# Patient Record
Sex: FEMALE | Race: WHITE | Employment: FULL TIME | URBAN - METROPOLITAN AREA
[De-identification: names, ages, dates, MRNs, and addresses within clinical notes are randomized per-mention and may not be internally consistent; named-entity substitution may affect disease eponyms.]

---

## 2017-01-27 ENCOUNTER — TELEPHONE (OUTPATIENT)
Dept: PULMONOLOGY | Age: 42
End: 2017-01-27

## 2017-02-02 ENCOUNTER — TELEPHONE (OUTPATIENT)
Dept: PULMONOLOGY | Age: 42
End: 2017-02-02

## 2017-02-13 ENCOUNTER — OFFICE VISIT (OUTPATIENT)
Dept: PULMONOLOGY | Age: 42
End: 2017-02-13

## 2017-02-13 VITALS
BODY MASS INDEX: 24.17 KG/M2 | RESPIRATION RATE: 16 BRPM | OXYGEN SATURATION: 96 % | DIASTOLIC BLOOD PRESSURE: 60 MMHG | WEIGHT: 154 LBS | SYSTOLIC BLOOD PRESSURE: 98 MMHG | HEIGHT: 67 IN | HEART RATE: 84 BPM

## 2017-02-13 DIAGNOSIS — R93.89 ABNORMAL CT OF THE CHEST: Primary | ICD-10-CM

## 2017-02-13 DIAGNOSIS — J01.91 ACUTE RECURRENT SINUSITIS, UNSPECIFIED LOCATION: ICD-10-CM

## 2017-02-13 DIAGNOSIS — J11.1 FLU: ICD-10-CM

## 2017-02-13 DIAGNOSIS — J45.51 SEVERE PERSISTENT ASTHMA WITH ACUTE EXACERBATION: ICD-10-CM

## 2017-02-13 PROCEDURE — 99214 OFFICE O/P EST MOD 30 MIN: CPT | Performed by: INTERNAL MEDICINE

## 2017-02-13 RX ORDER — PREDNISONE 10 MG/1
20 TABLET ORAL DAILY
Qty: 60 TABLET | Refills: 5 | Status: SHIPPED | OUTPATIENT
Start: 2017-02-13

## 2017-03-13 ENCOUNTER — HOSPITAL ENCOUNTER (OUTPATIENT)
Dept: CT IMAGING | Age: 42
Discharge: HOME | End: 2017-03-13
Attending: INTERNAL MEDICINE | Admitting: INTERNAL MEDICINE

## 2017-03-13 DIAGNOSIS — R93.89 ABNORMAL FINDINGS ON DIAGNOSTIC IMAGING OF OTHER SPECIFIED BODY STRUCTURES: ICD-10-CM

## 2017-03-13 DIAGNOSIS — J01.91 ACUTE RECURRENT SINUSITIS, UNSPECIFIED LOCATION: ICD-10-CM

## 2017-03-13 DIAGNOSIS — R93.89 ABNORMAL CT OF THE CHEST: ICD-10-CM

## 2017-03-13 DIAGNOSIS — J45.51 SEVERE PERSISTENT ASTHMA WITH ACUTE EXACERBATION: ICD-10-CM

## 2017-03-15 ENCOUNTER — OFFICE VISIT (OUTPATIENT)
Dept: PULMONOLOGY | Age: 42
End: 2017-03-15

## 2017-03-15 VITALS
SYSTOLIC BLOOD PRESSURE: 102 MMHG | BODY MASS INDEX: 25.74 KG/M2 | HEART RATE: 111 BPM | OXYGEN SATURATION: 94 % | RESPIRATION RATE: 16 BRPM | HEIGHT: 67 IN | WEIGHT: 164 LBS | DIASTOLIC BLOOD PRESSURE: 62 MMHG

## 2017-03-15 DIAGNOSIS — J45.50 UNCOMPLICATED SEVERE PERSISTENT ASTHMA: Primary | ICD-10-CM

## 2017-03-15 DIAGNOSIS — J32.4 CHRONIC PANSINUSITIS: ICD-10-CM

## 2017-03-15 PROCEDURE — 99214 OFFICE O/P EST MOD 30 MIN: CPT | Performed by: INTERNAL MEDICINE

## 2017-03-22 ENCOUNTER — OFFICE VISIT (OUTPATIENT)
Dept: ENT CLINIC | Age: 42
End: 2017-03-22

## 2017-03-22 VITALS
WEIGHT: 166.2 LBS | DIASTOLIC BLOOD PRESSURE: 66 MMHG | BODY MASS INDEX: 26.09 KG/M2 | HEIGHT: 67 IN | HEART RATE: 101 BPM | TEMPERATURE: 98 F | SYSTOLIC BLOOD PRESSURE: 116 MMHG

## 2017-03-22 DIAGNOSIS — J33.9 LEFT NASAL POLYPS: ICD-10-CM

## 2017-03-22 DIAGNOSIS — J32.4 CHRONIC PANSINUSITIS: Primary | ICD-10-CM

## 2017-03-22 PROCEDURE — 99214 OFFICE O/P EST MOD 30 MIN: CPT | Performed by: OTOLARYNGOLOGY

## 2017-03-30 ENCOUNTER — HOSPITAL ENCOUNTER (OUTPATIENT)
Dept: CT IMAGING | Age: 42
Discharge: HOME | End: 2017-03-30
Attending: OTOLARYNGOLOGY | Admitting: OTOLARYNGOLOGY

## 2017-03-30 DIAGNOSIS — J33.9 LEFT NASAL POLYPS: ICD-10-CM

## 2017-03-30 DIAGNOSIS — J32.4 CHRONIC PANSINUSITIS: ICD-10-CM

## 2017-04-17 ENCOUNTER — TELEPHONE (OUTPATIENT)
Dept: FAMILY MEDICINE CLINIC | Age: 42
End: 2017-04-17

## 2017-04-17 ENCOUNTER — OFFICE VISIT (OUTPATIENT)
Dept: FAMILY MEDICINE CLINIC | Age: 42
End: 2017-04-17

## 2017-04-17 VITALS
BODY MASS INDEX: 26.34 KG/M2 | TEMPERATURE: 98.5 F | OXYGEN SATURATION: 96 % | DIASTOLIC BLOOD PRESSURE: 76 MMHG | HEART RATE: 98 BPM | WEIGHT: 168.2 LBS | SYSTOLIC BLOOD PRESSURE: 112 MMHG

## 2017-04-17 DIAGNOSIS — J30.89 PERENNIAL ALLERGIC RHINITIS, UNSPECIFIED ALLERGIC RHINITIS TRIGGER: ICD-10-CM

## 2017-04-17 DIAGNOSIS — Z01.818 PRE-OP EXAMINATION: Primary | ICD-10-CM

## 2017-04-17 DIAGNOSIS — J33.8 NASAL SINUS POLYP: ICD-10-CM

## 2017-04-17 DIAGNOSIS — E87.6 HYPOKALEMIA: ICD-10-CM

## 2017-04-17 DIAGNOSIS — J45.909 SEVERE ASTHMA: ICD-10-CM

## 2017-04-17 DIAGNOSIS — Z01.818 PRE-OP EXAMINATION: ICD-10-CM

## 2017-04-17 DIAGNOSIS — D72.829 LEUKOCYTOSIS, UNSPECIFIED TYPE: Primary | ICD-10-CM

## 2017-04-17 LAB
A/G RATIO: 1.6 (ref 1.1–2.2)
ALBUMIN SERPL-MCNC: 4.2 G/DL (ref 3.4–5)
ALP BLD-CCNC: 61 U/L (ref 40–129)
ALT SERPL-CCNC: 12 U/L (ref 10–40)
ANION GAP SERPL CALCULATED.3IONS-SCNC: 16 MMOL/L (ref 3–16)
AST SERPL-CCNC: 12 U/L (ref 15–37)
BASOPHILS ABSOLUTE: 0.1 K/UL (ref 0–0.2)
BASOPHILS RELATIVE PERCENT: 0.4 %
BILIRUB SERPL-MCNC: 0.5 MG/DL (ref 0–1)
BUN BLDV-MCNC: 14 MG/DL (ref 7–20)
CALCIUM SERPL-MCNC: 9.4 MG/DL (ref 8.3–10.6)
CHLORIDE BLD-SCNC: 102 MMOL/L (ref 99–110)
CO2: 27 MMOL/L (ref 21–32)
CREAT SERPL-MCNC: 1 MG/DL (ref 0.6–1.1)
EOSINOPHILS ABSOLUTE: 1.6 K/UL (ref 0–0.6)
EOSINOPHILS RELATIVE PERCENT: 9.1 %
GFR AFRICAN AMERICAN: >60
GFR NON-AFRICAN AMERICAN: >60
GLOBULIN: 2.6 G/DL
GLUCOSE BLD-MCNC: 92 MG/DL (ref 70–99)
HCT VFR BLD CALC: 43.7 % (ref 36–48)
HEMOGLOBIN: 14.2 G/DL (ref 12–16)
LYMPHOCYTES ABSOLUTE: 4 K/UL (ref 1–5.1)
LYMPHOCYTES RELATIVE PERCENT: 22.9 %
MCH RBC QN AUTO: 28.1 PG (ref 26–34)
MCHC RBC AUTO-ENTMCNC: 32.6 G/DL (ref 31–36)
MCV RBC AUTO: 86.1 FL (ref 80–100)
MONOCYTES ABSOLUTE: 1 K/UL (ref 0–1.3)
MONOCYTES RELATIVE PERCENT: 6 %
NEUTROPHILS ABSOLUTE: 10.6 K/UL (ref 1.7–7.7)
NEUTROPHILS RELATIVE PERCENT: 61.6 %
PDW BLD-RTO: 14.6 % (ref 12.4–15.4)
PLATELET # BLD: 239 K/UL (ref 135–450)
PMV BLD AUTO: 8.9 FL (ref 5–10.5)
POTASSIUM SERPL-SCNC: 3.7 MMOL/L (ref 3.5–5.1)
RBC # BLD: 5.08 M/UL (ref 4–5.2)
SODIUM BLD-SCNC: 145 MMOL/L (ref 136–145)
TOTAL PROTEIN: 6.8 G/DL (ref 6.4–8.2)
WBC # BLD: 17.3 K/UL (ref 4–11)

## 2017-04-17 PROCEDURE — 93000 ELECTROCARDIOGRAM COMPLETE: CPT | Performed by: FAMILY MEDICINE

## 2017-04-17 PROCEDURE — 99243 OFF/OP CNSLTJ NEW/EST LOW 30: CPT | Performed by: FAMILY MEDICINE

## 2017-04-21 ENCOUNTER — HOSPITAL ENCOUNTER (OUTPATIENT)
Dept: PREADMISSION TESTING | Age: 42
Discharge: HOME | End: 2017-04-21
Attending: OTOLARYNGOLOGY | Admitting: OTOLARYNGOLOGY

## 2017-04-21 VITALS — BODY MASS INDEX: 26.37 KG/M2 | WEIGHT: 168 LBS | HEIGHT: 67 IN

## 2017-04-26 ENCOUNTER — HOSPITAL ENCOUNTER (OUTPATIENT)
Dept: SURGERY | Age: 42
Discharge: HOME | End: 2017-04-26
Admitting: OTOLARYNGOLOGY

## 2017-04-26 VITALS
TEMPERATURE: 98.4 F | HEIGHT: 67 IN | SYSTOLIC BLOOD PRESSURE: 122 MMHG | HEART RATE: 73 BPM | DIASTOLIC BLOOD PRESSURE: 80 MMHG | RESPIRATION RATE: 18 BRPM | BODY MASS INDEX: 26.37 KG/M2 | OXYGEN SATURATION: 92 % | WEIGHT: 168 LBS

## 2017-04-26 DIAGNOSIS — F12.10 CANNABIS ABUSE: ICD-10-CM

## 2017-04-26 LAB — PREGNANCY, URINE: NEGATIVE

## 2017-04-26 PROCEDURE — 31267 ENDOSCOPY MAXILLARY SINUS: CPT | Performed by: OTOLARYNGOLOGY

## 2017-04-26 PROCEDURE — 31276 NSL/SINS NDSC FRNT TISS RMVL: CPT | Performed by: OTOLARYNGOLOGY

## 2017-04-26 PROCEDURE — 31255 NSL/SINS NDSC W/TOT ETHMDCT: CPT | Performed by: OTOLARYNGOLOGY

## 2017-04-26 RX ORDER — MIDAZOLAM HYDROCHLORIDE 1 MG/ML
2 INJECTION INTRAMUSCULAR; INTRAVENOUS
Status: COMPLETED | OUTPATIENT
Start: 2017-04-26 | End: 2017-04-26

## 2017-04-26 RX ORDER — SODIUM CHLORIDE 0.9 % (FLUSH) 0.9 %
10 SYRINGE (ML) INJECTION EVERY 12 HOURS SCHEDULED
Status: DISCONTINUED | OUTPATIENT
Start: 2017-04-26 | End: 2017-04-27 | Stop reason: HOSPADM

## 2017-04-26 RX ORDER — MEPERIDINE HYDROCHLORIDE 25 MG/ML
12.5 INJECTION INTRAMUSCULAR; INTRAVENOUS; SUBCUTANEOUS EVERY 5 MIN PRN
Status: DISCONTINUED | OUTPATIENT
Start: 2017-04-26 | End: 2017-04-27 | Stop reason: HOSPADM

## 2017-04-26 RX ORDER — FENTANYL CITRATE 50 UG/ML
50 INJECTION, SOLUTION INTRAMUSCULAR; INTRAVENOUS EVERY 5 MIN PRN
Status: DISCONTINUED | OUTPATIENT
Start: 2017-04-26 | End: 2017-04-27 | Stop reason: HOSPADM

## 2017-04-26 RX ORDER — ALBUTEROL SULFATE 2.5 MG/3ML
2.5 SOLUTION RESPIRATORY (INHALATION) ONCE
Status: COMPLETED | OUTPATIENT
Start: 2017-04-26 | End: 2017-04-26

## 2017-04-26 RX ORDER — DEXAMETHASONE SODIUM PHOSPHATE 4 MG/ML
10 INJECTION, SOLUTION INTRA-ARTICULAR; INTRALESIONAL; INTRAMUSCULAR; INTRAVENOUS; SOFT TISSUE ONCE
Status: COMPLETED | OUTPATIENT
Start: 2017-04-26 | End: 2017-04-26

## 2017-04-26 RX ORDER — ONDANSETRON 2 MG/ML
4 INJECTION INTRAMUSCULAR; INTRAVENOUS ONCE
Status: COMPLETED | OUTPATIENT
Start: 2017-04-26 | End: 2017-04-26

## 2017-04-26 RX ORDER — ONDANSETRON 2 MG/ML
4 INJECTION INTRAMUSCULAR; INTRAVENOUS
Status: COMPLETED | OUTPATIENT
Start: 2017-04-26 | End: 2017-04-26

## 2017-04-26 RX ORDER — METOCLOPRAMIDE HYDROCHLORIDE 5 MG/ML
10 INJECTION INTRAMUSCULAR; INTRAVENOUS ONCE
Status: COMPLETED | OUTPATIENT
Start: 2017-04-26 | End: 2017-04-26

## 2017-04-26 RX ORDER — SODIUM CHLORIDE, SODIUM LACTATE, POTASSIUM CHLORIDE, CALCIUM CHLORIDE 600; 310; 30; 20 MG/100ML; MG/100ML; MG/100ML; MG/100ML
INJECTION, SOLUTION INTRAVENOUS CONTINUOUS
Status: DISCONTINUED | OUTPATIENT
Start: 2017-04-26 | End: 2017-04-27 | Stop reason: HOSPADM

## 2017-04-26 RX ORDER — SODIUM CHLORIDE 0.9 % (FLUSH) 0.9 %
10 SYRINGE (ML) INJECTION PRN
Status: DISCONTINUED | OUTPATIENT
Start: 2017-04-26 | End: 2017-04-27 | Stop reason: HOSPADM

## 2017-04-26 RX ORDER — GLYCOPYRROLATE 0.2 MG/ML
0.1 INJECTION INTRAMUSCULAR; INTRAVENOUS ONCE
Status: COMPLETED | OUTPATIENT
Start: 2017-04-26 | End: 2017-04-26

## 2017-04-26 RX ORDER — MORPHINE SULFATE 2 MG/ML
2 INJECTION, SOLUTION INTRAMUSCULAR; INTRAVENOUS EVERY 5 MIN PRN
Status: DISCONTINUED | OUTPATIENT
Start: 2017-04-26 | End: 2017-04-27 | Stop reason: HOSPADM

## 2017-04-26 RX ORDER — LABETALOL HYDROCHLORIDE 5 MG/ML
5 INJECTION, SOLUTION INTRAVENOUS EVERY 10 MIN PRN
Status: DISCONTINUED | OUTPATIENT
Start: 2017-04-26 | End: 2017-04-27 | Stop reason: HOSPADM

## 2017-04-26 RX ADMIN — ONDANSETRON 4 MG: 2 INJECTION INTRAMUSCULAR; INTRAVENOUS at 13:03

## 2017-04-26 RX ADMIN — ALBUTEROL SULFATE 2.5 MG: 2.5 SOLUTION RESPIRATORY (INHALATION) at 07:47

## 2017-04-26 RX ADMIN — SODIUM CHLORIDE, SODIUM LACTATE, POTASSIUM CHLORIDE, CALCIUM CHLORIDE: 600; 310; 30; 20 INJECTION, SOLUTION INTRAVENOUS at 07:35

## 2017-04-26 RX ADMIN — MIDAZOLAM HYDROCHLORIDE 2 MG: 1 INJECTION INTRAMUSCULAR; INTRAVENOUS at 08:12

## 2017-04-26 RX ADMIN — DEXAMETHASONE SODIUM PHOSPHATE 10 MG: 4 INJECTION, SOLUTION INTRA-ARTICULAR; INTRALESIONAL; INTRAMUSCULAR; INTRAVENOUS; SOFT TISSUE at 07:35

## 2017-04-26 RX ADMIN — GLYCOPYRROLATE 0.1 MG: 0.2 INJECTION INTRAMUSCULAR; INTRAVENOUS at 07:36

## 2017-04-26 RX ADMIN — METOCLOPRAMIDE HYDROCHLORIDE 10 MG: 5 INJECTION INTRAMUSCULAR; INTRAVENOUS at 07:36

## 2017-04-26 RX ADMIN — ONDANSETRON 4 MG: 2 INJECTION INTRAMUSCULAR; INTRAVENOUS at 07:36

## 2017-04-26 ASSESSMENT — PAIN - FUNCTIONAL ASSESSMENT
PAIN_FUNCTIONAL_ASSESSMENT: 0-10

## 2017-04-26 ASSESSMENT — PAIN SCALES - GENERAL: PAINLEVEL_OUTOF10: 0

## 2017-04-26 ASSESSMENT — PAIN DESCRIPTION - DESCRIPTORS: DESCRIPTORS: ACHING

## 2017-05-03 ENCOUNTER — OFFICE VISIT (OUTPATIENT)
Dept: ENT CLINIC | Age: 42
End: 2017-05-03

## 2017-05-03 DIAGNOSIS — J32.9 CHRONIC SINUSITIS, UNSPECIFIED LOCATION: Primary | ICD-10-CM

## 2017-05-03 PROCEDURE — 31231 NASAL ENDOSCOPY DX: CPT | Performed by: OTOLARYNGOLOGY

## 2017-05-17 ENCOUNTER — OFFICE VISIT (OUTPATIENT)
Dept: ENT CLINIC | Age: 42
End: 2017-05-17

## 2017-05-17 VITALS
HEART RATE: 98 BPM | WEIGHT: 167.6 LBS | DIASTOLIC BLOOD PRESSURE: 69 MMHG | HEIGHT: 67 IN | SYSTOLIC BLOOD PRESSURE: 108 MMHG | TEMPERATURE: 98.4 F | BODY MASS INDEX: 26.3 KG/M2

## 2017-05-17 DIAGNOSIS — J33.9 SINUSITIS WITH NASAL POLYPS: Primary | ICD-10-CM

## 2017-05-17 DIAGNOSIS — J32.9 SINUSITIS WITH NASAL POLYPS: Primary | ICD-10-CM

## 2017-05-17 PROCEDURE — 31237 NSL/SINS NDSC SURG BX POLYPC: CPT | Performed by: OTOLARYNGOLOGY

## 2017-06-07 ENCOUNTER — OFFICE VISIT (OUTPATIENT)
Dept: ENT CLINIC | Age: 42
End: 2017-06-07

## 2017-06-07 VITALS
SYSTOLIC BLOOD PRESSURE: 121 MMHG | BODY MASS INDEX: 26.37 KG/M2 | OXYGEN SATURATION: 90 % | DIASTOLIC BLOOD PRESSURE: 67 MMHG | WEIGHT: 168 LBS | HEIGHT: 67 IN | HEART RATE: 103 BPM | TEMPERATURE: 98.9 F

## 2017-06-07 DIAGNOSIS — J32.4 CHRONIC PANSINUSITIS: Primary | ICD-10-CM

## 2017-06-07 PROCEDURE — 31237 NSL/SINS NDSC SURG BX POLYPC: CPT | Performed by: OTOLARYNGOLOGY

## 2017-07-10 ENCOUNTER — OFFICE VISIT (OUTPATIENT)
Dept: ENT CLINIC | Age: 42
End: 2017-07-10

## 2017-07-10 VITALS
HEIGHT: 67 IN | HEART RATE: 85 BPM | BODY MASS INDEX: 25.9 KG/M2 | DIASTOLIC BLOOD PRESSURE: 57 MMHG | WEIGHT: 165 LBS | SYSTOLIC BLOOD PRESSURE: 115 MMHG | TEMPERATURE: 98.6 F

## 2017-07-10 DIAGNOSIS — J32.4 CHRONIC PANSINUSITIS: Primary | ICD-10-CM

## 2017-07-10 PROCEDURE — 31231 NASAL ENDOSCOPY DX: CPT | Performed by: OTOLARYNGOLOGY

## 2017-09-18 ENCOUNTER — OFFICE VISIT (OUTPATIENT)
Dept: FAMILY MEDICINE CLINIC | Age: 42
End: 2017-09-18

## 2017-09-18 VITALS
HEART RATE: 84 BPM | HEIGHT: 67 IN | DIASTOLIC BLOOD PRESSURE: 78 MMHG | WEIGHT: 169.4 LBS | OXYGEN SATURATION: 96 % | TEMPERATURE: 98.3 F | BODY MASS INDEX: 26.59 KG/M2 | SYSTOLIC BLOOD PRESSURE: 118 MMHG

## 2017-09-18 DIAGNOSIS — Z12.39 BREAST CANCER SCREENING: ICD-10-CM

## 2017-09-18 DIAGNOSIS — Z23 NEED FOR VACCINATION: ICD-10-CM

## 2017-09-18 DIAGNOSIS — Z00.00 ANNUAL PHYSICAL EXAM: Primary | ICD-10-CM

## 2017-09-18 DIAGNOSIS — Z00.00 ANNUAL PHYSICAL EXAM: ICD-10-CM

## 2017-09-18 LAB
A/G RATIO: 1.7 (ref 1.1–2.2)
ALBUMIN SERPL-MCNC: 4.4 G/DL (ref 3.4–5)
ALP BLD-CCNC: 63 U/L (ref 40–129)
ALT SERPL-CCNC: 15 U/L (ref 10–40)
ANION GAP SERPL CALCULATED.3IONS-SCNC: 17 MMOL/L (ref 3–16)
AST SERPL-CCNC: 12 U/L (ref 15–37)
BASOPHILS ABSOLUTE: 0 K/UL (ref 0–0.2)
BASOPHILS RELATIVE PERCENT: 0.3 %
BILIRUB SERPL-MCNC: 0.8 MG/DL (ref 0–1)
BILIRUBIN, POC: NORMAL
BLOOD URINE, POC: NORMAL
BUN BLDV-MCNC: 14 MG/DL (ref 7–20)
CALCIUM SERPL-MCNC: 9.7 MG/DL (ref 8.3–10.6)
CHLORIDE BLD-SCNC: 100 MMOL/L (ref 99–110)
CHOLESTEROL, TOTAL: 231 MG/DL (ref 0–199)
CLARITY, POC: CLEAR
CO2: 23 MMOL/L (ref 21–32)
COLOR, POC: YELLOW
CREAT SERPL-MCNC: 0.7 MG/DL (ref 0.6–1.1)
EOSINOPHILS ABSOLUTE: 0.1 K/UL (ref 0–0.6)
EOSINOPHILS RELATIVE PERCENT: 0.6 %
GFR AFRICAN AMERICAN: >60
GFR NON-AFRICAN AMERICAN: >60
GLOBULIN: 2.6 G/DL
GLUCOSE BLD-MCNC: 85 MG/DL (ref 70–99)
GLUCOSE URINE, POC: NORMAL
HCT VFR BLD CALC: 44.6 % (ref 36–48)
HDLC SERPL-MCNC: 116 MG/DL (ref 40–60)
HEMOGLOBIN: 15 G/DL (ref 12–16)
KETONES, POC: NORMAL
LDL CHOLESTEROL CALCULATED: 100 MG/DL
LEUKOCYTE EST, POC: NORMAL
LYMPHOCYTES ABSOLUTE: 1.1 K/UL (ref 1–5.1)
LYMPHOCYTES RELATIVE PERCENT: 8.2 %
MCH RBC QN AUTO: 29.5 PG (ref 26–34)
MCHC RBC AUTO-ENTMCNC: 33.6 G/DL (ref 31–36)
MCV RBC AUTO: 87.9 FL (ref 80–100)
MONOCYTES ABSOLUTE: 0.5 K/UL (ref 0–1.3)
MONOCYTES RELATIVE PERCENT: 3.7 %
NEUTROPHILS ABSOLUTE: 11.9 K/UL (ref 1.7–7.7)
NEUTROPHILS RELATIVE PERCENT: 87.2 %
NITRITE, POC: NORMAL
PDW BLD-RTO: 14.2 % (ref 12.4–15.4)
PH, POC: 6
PLATELET # BLD: 215 K/UL (ref 135–450)
PMV BLD AUTO: 9 FL (ref 5–10.5)
POTASSIUM SERPL-SCNC: 4.1 MMOL/L (ref 3.5–5.1)
PROTEIN, POC: NORMAL
RBC # BLD: 5.07 M/UL (ref 4–5.2)
SODIUM BLD-SCNC: 140 MMOL/L (ref 136–145)
SPECIFIC GRAVITY, POC: 1.01
TOTAL PROTEIN: 7 G/DL (ref 6.4–8.2)
TRIGL SERPL-MCNC: 75 MG/DL (ref 0–150)
UROBILINOGEN, POC: 0.2
VITAMIN D 25-HYDROXY: 18 NG/ML
VLDLC SERPL CALC-MCNC: 15 MG/DL
WBC # BLD: 13.6 K/UL (ref 4–11)

## 2017-09-18 PROCEDURE — 93000 ELECTROCARDIOGRAM COMPLETE: CPT | Performed by: FAMILY MEDICINE

## 2017-09-18 PROCEDURE — 90715 TDAP VACCINE 7 YRS/> IM: CPT | Performed by: FAMILY MEDICINE

## 2017-09-18 PROCEDURE — 90732 PPSV23 VACC 2 YRS+ SUBQ/IM: CPT | Performed by: FAMILY MEDICINE

## 2017-09-18 PROCEDURE — 90471 IMMUNIZATION ADMIN: CPT | Performed by: FAMILY MEDICINE

## 2017-09-18 PROCEDURE — 90686 IIV4 VACC NO PRSV 0.5 ML IM: CPT | Performed by: FAMILY MEDICINE

## 2017-09-18 PROCEDURE — 99396 PREV VISIT EST AGE 40-64: CPT | Performed by: FAMILY MEDICINE

## 2017-09-18 PROCEDURE — 90472 IMMUNIZATION ADMIN EACH ADD: CPT | Performed by: FAMILY MEDICINE

## 2017-09-18 PROCEDURE — 81002 URINALYSIS NONAUTO W/O SCOPE: CPT | Performed by: FAMILY MEDICINE

## 2017-09-18 RX ORDER — ALBUTEROL SULFATE 2.5 MG/3ML
2.5 SOLUTION RESPIRATORY (INHALATION) EVERY 4 HOURS PRN
COMMUNITY
Start: 2017-08-24

## 2017-09-18 RX ORDER — MEPOLIZUMAB 100 MG/ML
300 INJECTION, POWDER, FOR SOLUTION SUBCUTANEOUS
COMMUNITY
Start: 2017-09-05

## 2017-09-18 ASSESSMENT — PATIENT HEALTH QUESTIONNAIRE - PHQ9
2. FEELING DOWN, DEPRESSED OR HOPELESS: 0
1. LITTLE INTEREST OR PLEASURE IN DOING THINGS: 0
SUM OF ALL RESPONSES TO PHQ QUESTIONS 1-9: 0
SUM OF ALL RESPONSES TO PHQ9 QUESTIONS 1 & 2: 0

## 2017-09-20 DIAGNOSIS — R31.9 HEMATURIA: Primary | ICD-10-CM

## 2017-10-31 ENCOUNTER — TELEPHONE (OUTPATIENT)
Dept: FAMILY MEDICINE CLINIC | Age: 42
End: 2017-10-31

## 2018-10-31 ENCOUNTER — OFFICE VISIT (OUTPATIENT)
Dept: FAMILY MEDICINE CLINIC | Age: 43
End: 2018-10-31
Payer: COMMERCIAL

## 2018-10-31 VITALS
WEIGHT: 183.8 LBS | BODY MASS INDEX: 28.85 KG/M2 | HEART RATE: 65 BPM | TEMPERATURE: 98.8 F | SYSTOLIC BLOOD PRESSURE: 122 MMHG | HEIGHT: 67 IN | OXYGEN SATURATION: 98 % | RESPIRATION RATE: 16 BRPM | DIASTOLIC BLOOD PRESSURE: 82 MMHG

## 2018-10-31 DIAGNOSIS — Z00.00 PHYSICAL EXAM, ANNUAL: Primary | ICD-10-CM

## 2018-10-31 DIAGNOSIS — Z23 NEED FOR INFLUENZA VACCINATION: ICD-10-CM

## 2018-10-31 DIAGNOSIS — Z00.00 PHYSICAL EXAM, ANNUAL: ICD-10-CM

## 2018-10-31 LAB
A/G RATIO: 1.7 (ref 1.1–2.2)
ALBUMIN SERPL-MCNC: 4.1 G/DL (ref 3.4–5)
ALP BLD-CCNC: 55 U/L (ref 40–129)
ALT SERPL-CCNC: 14 U/L (ref 10–40)
ANION GAP SERPL CALCULATED.3IONS-SCNC: 14 MMOL/L (ref 3–16)
AST SERPL-CCNC: 11 U/L (ref 15–37)
BASOPHILS ABSOLUTE: 0.1 K/UL (ref 0–0.2)
BASOPHILS RELATIVE PERCENT: 0.4 %
BILIRUB SERPL-MCNC: 0.5 MG/DL (ref 0–1)
BILIRUBIN, POC: NORMAL
BLOOD URINE, POC: NORMAL
BUN BLDV-MCNC: 12 MG/DL (ref 7–20)
CALCIUM SERPL-MCNC: 9.5 MG/DL (ref 8.3–10.6)
CHLORIDE BLD-SCNC: 102 MMOL/L (ref 99–110)
CHOLESTEROL, TOTAL: 234 MG/DL (ref 0–199)
CLARITY, POC: CLEAR
CO2: 25 MMOL/L (ref 21–32)
COLOR, POC: YELLOW
CREAT SERPL-MCNC: 0.8 MG/DL (ref 0.6–1.1)
EOSINOPHILS ABSOLUTE: 0 K/UL (ref 0–0.6)
EOSINOPHILS RELATIVE PERCENT: 0.2 %
GFR AFRICAN AMERICAN: >60
GFR NON-AFRICAN AMERICAN: >60
GLOBULIN: 2.4 G/DL
GLUCOSE BLD-MCNC: 84 MG/DL (ref 70–99)
GLUCOSE URINE, POC: NORMAL
HCT VFR BLD CALC: 42.8 % (ref 36–48)
HDLC SERPL-MCNC: 100 MG/DL (ref 40–60)
HEMOGLOBIN: 14.3 G/DL (ref 12–16)
KETONES, POC: NORMAL
LDL CHOLESTEROL CALCULATED: 117 MG/DL
LEUKOCYTE EST, POC: NORMAL
LYMPHOCYTES ABSOLUTE: 1.5 K/UL (ref 1–5.1)
LYMPHOCYTES RELATIVE PERCENT: 10.8 %
MCH RBC QN AUTO: 29.1 PG (ref 26–34)
MCHC RBC AUTO-ENTMCNC: 33.5 G/DL (ref 31–36)
MCV RBC AUTO: 87 FL (ref 80–100)
MONOCYTES ABSOLUTE: 0.7 K/UL (ref 0–1.3)
MONOCYTES RELATIVE PERCENT: 4.6 %
NEUTROPHILS ABSOLUTE: 12 K/UL (ref 1.7–7.7)
NEUTROPHILS RELATIVE PERCENT: 84 %
NITRITE, POC: NORMAL
PDW BLD-RTO: 13.5 % (ref 12.4–15.4)
PH, POC: 6.5
PLATELET # BLD: 244 K/UL (ref 135–450)
PMV BLD AUTO: 8.9 FL (ref 5–10.5)
POTASSIUM SERPL-SCNC: 4.2 MMOL/L (ref 3.5–5.1)
PROTEIN, POC: NORMAL
RBC # BLD: 4.92 M/UL (ref 4–5.2)
SODIUM BLD-SCNC: 141 MMOL/L (ref 136–145)
SPECIFIC GRAVITY, POC: 1.02
TOTAL PROTEIN: 6.5 G/DL (ref 6.4–8.2)
TRIGL SERPL-MCNC: 86 MG/DL (ref 0–150)
TSH REFLEX: 0.61 UIU/ML (ref 0.27–4.2)
UROBILINOGEN, POC: 0.2
VLDLC SERPL CALC-MCNC: 17 MG/DL
WBC # BLD: 14.2 K/UL (ref 4–11)

## 2018-10-31 PROCEDURE — 81002 URINALYSIS NONAUTO W/O SCOPE: CPT | Performed by: FAMILY MEDICINE

## 2018-10-31 PROCEDURE — 99396 PREV VISIT EST AGE 40-64: CPT | Performed by: FAMILY MEDICINE

## 2018-10-31 PROCEDURE — 93000 ELECTROCARDIOGRAM COMPLETE: CPT | Performed by: FAMILY MEDICINE

## 2018-10-31 PROCEDURE — 90686 IIV4 VACC NO PRSV 0.5 ML IM: CPT | Performed by: FAMILY MEDICINE

## 2018-10-31 PROCEDURE — 90471 IMMUNIZATION ADMIN: CPT | Performed by: FAMILY MEDICINE

## 2018-11-02 LAB — VITAMIN D 1,25-DIHYDROXY: 48.9 PG/ML (ref 19.9–79.3)

## 2018-11-28 ENCOUNTER — OFFICE VISIT (OUTPATIENT)
Dept: FAMILY MEDICINE CLINIC | Age: 43
End: 2018-11-28
Payer: COMMERCIAL

## 2018-11-28 VITALS
OXYGEN SATURATION: 95 % | HEART RATE: 91 BPM | WEIGHT: 185.2 LBS | TEMPERATURE: 98.4 F | SYSTOLIC BLOOD PRESSURE: 128 MMHG | RESPIRATION RATE: 18 BRPM | DIASTOLIC BLOOD PRESSURE: 76 MMHG | BODY MASS INDEX: 29.01 KG/M2

## 2018-11-28 DIAGNOSIS — J40 BRONCHITIS: Primary | ICD-10-CM

## 2018-11-28 DIAGNOSIS — J06.9 VIRAL URI: ICD-10-CM

## 2018-11-28 DIAGNOSIS — J45.50 SEVERE PERSISTENT ASTHMA, UNSPECIFIED WHETHER COMPLICATED: ICD-10-CM

## 2018-11-28 DIAGNOSIS — J84.10 GRANULOMATOUS LUNG DISEASE (HCC): ICD-10-CM

## 2018-11-28 PROCEDURE — 99214 OFFICE O/P EST MOD 30 MIN: CPT | Performed by: FAMILY MEDICINE

## 2018-11-28 RX ORDER — DOXYCYCLINE HYCLATE 100 MG
100 TABLET ORAL 2 TIMES DAILY
Qty: 14 TABLET | Refills: 0 | Status: SHIPPED | OUTPATIENT
Start: 2018-11-28 | End: 2018-12-05

## 2018-11-28 NOTE — PROGRESS NOTES
congestion. Doxycycline per orders. RTC prn.  CXR, Alb prn   Re evaluate earlier if SOB,high fever  Reviewed current mgt and fu with rheum,pulm

## 2019-02-14 ENCOUNTER — HOSPITAL ENCOUNTER (OUTPATIENT)
Dept: MAMMOGRAPHY | Age: 44
Discharge: HOME | End: 2019-02-19
Payer: COMMERCIAL

## 2019-02-14 DIAGNOSIS — Z12.31 VISIT FOR SCREENING MAMMOGRAM: ICD-10-CM

## 2019-02-14 PROCEDURE — 77067 SCR MAMMO BI INCL CAD: CPT

## 2019-02-21 ENCOUNTER — APPOINTMENT (OUTPATIENT)
Dept: ULTRASOUND IMAGING | Age: 44
End: 2019-02-21
Payer: COMMERCIAL

## 2019-02-21 ENCOUNTER — HOSPITAL ENCOUNTER (OUTPATIENT)
Dept: MAMMOGRAPHY | Age: 44
Discharge: HOME | End: 2019-02-21
Payer: COMMERCIAL

## 2019-02-21 DIAGNOSIS — R92.8 ABNORMAL MAMMOGRAM: ICD-10-CM

## 2019-02-21 PROCEDURE — G0279 TOMOSYNTHESIS, MAMMO: HCPCS

## 2019-11-15 ENCOUNTER — OFFICE VISIT (OUTPATIENT)
Dept: FAMILY MEDICINE CLINIC | Age: 44
End: 2019-11-15
Payer: COMMERCIAL

## 2019-11-15 VITALS
WEIGHT: 175 LBS | HEART RATE: 73 BPM | BODY MASS INDEX: 27.41 KG/M2 | TEMPERATURE: 98.7 F | DIASTOLIC BLOOD PRESSURE: 70 MMHG | OXYGEN SATURATION: 95 % | SYSTOLIC BLOOD PRESSURE: 100 MMHG

## 2019-11-15 DIAGNOSIS — Z00.00 ANNUAL PHYSICAL EXAM: Primary | ICD-10-CM

## 2019-11-15 LAB
BILIRUBIN, POC: ABNORMAL
BLOOD URINE, POC: ABNORMAL
CLARITY, POC: ABNORMAL
COLOR, POC: YELLOW
GLUCOSE URINE, POC: ABNORMAL
KETONES, POC: ABNORMAL
LEUKOCYTE EST, POC: ABNORMAL
NITRITE, POC: ABNORMAL
PH, POC: 6
PROTEIN, POC: ABNORMAL
SPECIFIC GRAVITY, POC: 1.01
UROBILINOGEN, POC: ABNORMAL

## 2019-11-15 PROCEDURE — 90686 IIV4 VACC NO PRSV 0.5 ML IM: CPT | Performed by: FAMILY MEDICINE

## 2019-11-15 PROCEDURE — 81002 URINALYSIS NONAUTO W/O SCOPE: CPT | Performed by: FAMILY MEDICINE

## 2019-11-15 PROCEDURE — 93000 ELECTROCARDIOGRAM COMPLETE: CPT | Performed by: FAMILY MEDICINE

## 2019-11-15 PROCEDURE — 99396 PREV VISIT EST AGE 40-64: CPT | Performed by: FAMILY MEDICINE

## 2019-11-15 PROCEDURE — 90471 IMMUNIZATION ADMIN: CPT | Performed by: FAMILY MEDICINE

## 2020-12-04 ENCOUNTER — OFFICE VISIT (OUTPATIENT)
Dept: ENT CLINIC | Age: 45
End: 2020-12-04
Payer: COMMERCIAL

## 2020-12-04 VITALS
HEIGHT: 67 IN | SYSTOLIC BLOOD PRESSURE: 122 MMHG | BODY MASS INDEX: 29.03 KG/M2 | DIASTOLIC BLOOD PRESSURE: 59 MMHG | TEMPERATURE: 97.3 F | HEART RATE: 79 BPM | WEIGHT: 185 LBS

## 2020-12-04 PROCEDURE — 99204 OFFICE O/P NEW MOD 45 MIN: CPT | Performed by: OTOLARYNGOLOGY

## 2020-12-04 PROCEDURE — 31231 NASAL ENDOSCOPY DX: CPT | Performed by: OTOLARYNGOLOGY

## 2020-12-04 ASSESSMENT — ENCOUNTER SYMPTOMS
WHEEZING: 0
DIARRHEA: 0
BACK PAIN: 0
SINUS PRESSURE: 1
SINUS PAIN: 0
SORE THROAT: 0
EYE DISCHARGE: 0
VOMITING: 0
SHORTNESS OF BREATH: 0
PHOTOPHOBIA: 0
COLOR CHANGE: 0
EYE ITCHING: 0
CONSTIPATION: 0
VOICE CHANGE: 0
STRIDOR: 0
TROUBLE SWALLOWING: 0
CHOKING: 0
RHINORRHEA: 1
BLOOD IN STOOL: 0
COUGH: 0
FACIAL SWELLING: 0
NAUSEA: 0

## 2020-12-04 NOTE — PROGRESS NOTES
[facility]  [address], [facility], [facility] Renetta Arora  P: [phone]  F: [phone]       Patient     Elif Stoner  1975    ChiefComplaint     Chief Complaint   Patient presents with    New Patient     Patient is here today because she has no taste or smell and the right side of her sinues are completely blocked, she had polyps removed about 3 1/2 year ago by by Dr. Marin Mccracken       History of Present Illness     Elif Stoner is a pleasant 39 y.o. female who presents as a new patient today for nasal polyp and chronic pansinusitis. Patient previously seen by an ENT with [facility] years ago. She was diagnosed with nasal polyps and underwent endoscopic sinus surgery in early 2017. She has not had any issues until the past 6 to 8 months. She developed nasal obstruction and anosmia. She also has decreased sense of taste. She has significant rhinorrhea and postnasal drainage as well. She feels congested and sinus pressure all the time. Right-sided worse than left. She was recently placed on antibiotic which has not significantly helped her symptoms. She has not had any recent CAT scan imaging. She has not been using nasal steroid sprays. 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 Typically the prednisone helps her nasal polyps, however it has not in the past 6 to 8 months. She was allergy tested in the past and did not undergo immunotherapy due to only 1 cat allergen positive. She does not take antihistamines.     Past Medical History     Past Medical History:   Diagnosis Date    Allergic rhinitis     Asthma     being evaluated 4/2016    Meniere disease        Past Surgical History     Past Surgical History:   Procedure Laterality Date    DILATION AND CURETTAGE OF UTERUS      ENDOSCOPY, COLON, DIAGNOSTIC  4-    bronchscopy w/ BAL    SINUS SURGERY Bilateral 04/26/2017    BILATERAL FRONTAL SINUSOTOMIES WITH REMOVAL OF CONTENTS, BILATERAL ETHMOIDECTOMIES WITH REMOVAL OF CONTENTS, BILATERAL MAXILLARY ANTHROSTOMIES WITH REMOVAL OF CONTENTS       Family History     Family History   Problem Relation Age of Onset    Heart Disease Father        Social History     Social History     Socioeconomic History    Marital status:      Spouse name: Not on file    Number of children: Not on file    Years of education: Not on file    Highest education level: Not on file   Occupational History    Not on file   Social Needs    Financial resource strain: Not on file    Food insecurity     Worry: Not on file     Inability: Not on file    Transportation needs     Medical: Not on file     Non-medical: Not on file   Tobacco Use    Smoking status: Never Smoker    Smokeless tobacco: Never Used   Substance and Sexual Activity    Alcohol use:  Yes     Alcohol/week: 2.0 standard drinks     Types: 2 Cans of beer per week     Comment: 2 beers a week    Drug use: No    Sexual activity: Not on file   Lifestyle    Physical activity     Days per week: Not on file     Minutes per session: Not on file    Stress: Not on file   Relationships    Social connections     Talks on phone: Not on file     Gets together: Not on file     Attends [employer] service: Not on file     Active member of club or organization: Not on file     Attends meetings of clubs or organizations: Not on file     Relationship status: Not on file    Intimate partner violence     Fear of current or ex partner: Not on file     Emotionally abused: Not on file     Physically abused: Not on file     Forced sexual activity: Not on file   Other Topics Concern    Not on file   Social History Narrative    Not on file       Allergies     Allergies   Allergen Reactions    Nickel      Skin sensitive to inexpensive earrings       Medications     Current Outpatient Medications   Medication Sig Dispense Refill    albuterol (PROVENTIL) (2.5 MG/3ML) 0.083% nebulizer solution       NUCALA 100 MG SOLR injection       Calcium-Magnesium-Vitamin D (CALCIUM 500 PO) Take 1,000 mg by mouth daily      predniSONE (DELTASONE) 10 MG tablet Take 2 tablets by mouth daily 60 tablet 5    PROAIR  (90 BASE) MCG/ACT inhaler INHALE TWO PUFFS BY MOUTH FOUR TIMES A DAY 1 Inhaler 11    Respiratory Therapy Supplies (NEBULIZER/TUBING/MOUTHPIECE) KIT 1 kit by Does not apply route daily as needed Dx: Asthma Severe       ICD-10: J45.909 1 kit 0    Misc. Devices (ACAPELLA) MISC To use after neb treatment 1 each 0    fluticasone-salmeterol (ADVAIR HFA) 230-21 MCG/ACT inhaler Inhale 2 puffs into the lungs 2 times daily 1 Inhaler 0     No current facility-administered medications for this visit. Review of Systems     Review of Systems   Constitutional: Negative for activity change, appetite change, chills, diaphoresis, fatigue, fever and unexpected weight change. HENT: Positive for congestion, postnasal drip, rhinorrhea and sinus pressure. Negative for dental problem, drooling, ear discharge, ear pain, facial swelling, hearing loss, mouth sores, nosebleeds, sinus pain, sneezing, sore throat, tinnitus, trouble swallowing and voice change. Eyes: Negative for photophobia, discharge, itching and visual disturbance. Respiratory: Negative for cough, choking, shortness of breath, wheezing and stridor. Gastrointestinal: Negative for blood in stool, constipation, diarrhea, nausea and vomiting. Endocrine: Negative for cold intolerance, heat intolerance, polyphagia and polyuria. Musculoskeletal: Negative for back pain, gait problem, neck pain and neck stiffness. Skin: Negative for color change, pallor, rash and wound. Neurological: Negative for dizziness, syncope, facial asymmetry, speech difficulty, light-headedness, numbness and headaches. Hematological: Negative for adenopathy. Does not bruise/bleed easily. Psychiatric/Behavioral: Negative for agitation, confusion and sleep disturbance.          PhysicalExam Vitals:    12/04/20 1343   BP: (!) 122/59   Pulse: 79   Temp: 97.3 °F (36.3 °C)       Physical Exam  Constitutional:       Appearance: She is well-developed. HENT:      Head: Normocephalic and atraumatic. Not macrocephalic and not microcephalic. No raccoon eyes, Garibay's sign, abrasion, contusion, right periorbital erythema, left periorbital erythema or laceration. Hair is normal.      Jaw: No trismus. Right Ear: Hearing, tympanic membrane and external ear normal. No decreased hearing noted. No drainage, swelling or tenderness. No middle ear effusion. No mastoid tenderness. Tympanic membrane is not perforated, retracted or bulging. Tympanic membrane has normal mobility. Left Ear: Hearing, tympanic membrane and external ear normal. No decreased hearing noted. No drainage, swelling or tenderness. No middle ear effusion. No mastoid tenderness. Tympanic membrane is not perforated, retracted or bulging. Tympanic membrane has normal mobility. Nose: Mucosal edema, congestion and rhinorrhea present. No nasal deformity, septal deviation or laceration. Right Nostril: Occlusion present. Left Nostril: Occlusion present. Right Turbinates: Enlarged and swollen. Left Turbinates: Enlarged and swollen. Right Sinus: No maxillary sinus tenderness or frontal sinus tenderness. Left Sinus: No maxillary sinus tenderness or frontal sinus tenderness. Mouth/Throat:      Mouth: Mucous membranes are not pale, not dry and not cyanotic. No lacerations or oral lesions. Dentition: Normal dentition. No dental caries or dental abscesses. Pharynx: Uvula midline. No oropharyngeal exudate, posterior oropharyngeal erythema or uvula swelling. Tonsils: No tonsillar abscesses. Eyes:      General: Lids are normal.         Right eye: No discharge. Left eye: No discharge. Extraocular Movements:      Right eye: Normal extraocular motion and no nystagmus.       Left eye: Normal extraocular motion and no nystagmus. Conjunctiva/sclera:      Right eye: No chemosis or exudate. Left eye: No chemosis or exudate. Neck:      Musculoskeletal: Neck supple. Thyroid: No thyroid mass or thyromegaly. Vascular: Normal carotid pulses. Trachea: No tracheal tenderness or tracheal deviation. Cardiovascular:      Rate and Rhythm: Normal rate and regular rhythm. Pulmonary:      Effort: No tachypnea, bradypnea or respiratory distress. Breath sounds: No stridor. Musculoskeletal:      Right shoulder: She exhibits normal range of motion. Lymphadenopathy:      Head:      Right side of head: No submental, submandibular, tonsillar, preauricular, posterior auricular or occipital adenopathy. Left side of head: No submental, submandibular, tonsillar, preauricular, posterior auricular or occipital adenopathy. Cervical: No cervical adenopathy. Right cervical: No superficial, deep or posterior cervical adenopathy. Left cervical: No superficial, deep or posterior cervical adenopathy. Skin:     General: Skin is warm and dry. Findings: No bruising, erythema, laceration, lesion or rash. Neurological:      Mental Status: She is alert and oriented to person, place, and time. Cranial Nerves: No cranial nerve deficit. Psychiatric:         Speech: Speech normal.         Behavior: Behavior normal.           Procedure     Rigid Nasal Endoscopy    Preop: Nasal polyposis chronic pansinusitis  Anes: topical lidocaine with afrin  Consent: verbal  Description:  After obtaining verbal consent from the patient 4% lidocaine with afrin was sprayed into the nasal cavities. After allowing a time for anesthesia, a nasal endoscope was placed into the naris. The septum, inferior, and middle turbinates were examined. The middle meatus, and sphenoethmoid recess was examined bilaterally.       Significant amount of nasal polyp within bilateral nasal cavities, right greater than left. Right totally obstructed, left near-total obstruction. Moderate to copious amounts of clear thick mucus secretions. No mucopurulent drainage. Turbinates enlarged. Tolerated well without complication. Assessment and Plan     1. Chronic pansinusitis  Patient has recurrent nasal polyposis and chronic pansinusitis. Patient also has Churg-Angela syndrome and is on prednisone chronically. She is currently being weaned off of prednisone. She has not been using nasal steroid sprays. Given the extent of her recurrence, she unfortunately needs to undergo revision endoscopic sinus surgery. I recommend a CT of the sinus for image guidance. Risk, benefits and alternatives of endoscopic revision surgery discussed with the patient. Risk include, but not limited to bleeding, infection, pain, synechia formation, epiphora, polyp recurrence, damage to the orbit and blindness, damage to skull base and CSF leak. Patient understands his risks and is willing to proceed with the procedure. We will have the images uploaded to our system after they our completed. She may call in the meantime with any questions or concerns.  - CT SINUS WO CONTRAST; Future    2. Sinusitis with nasal polyps    - CT SINUS WO CONTRAST; Future    3. Anosmia      4. Nasal turbinate hypertrophy        Return for 1 week post op. Portions of this note were dictated using Dragon.  There may be linguistic errors secondary to the use of this program.

## 2020-12-15 NOTE — PROGRESS NOTES
[facility] / [facility] ([facility]) LA[address] [place], [address]    Acknowledgment of Informed Consent for Surgical or Medical Procedure and Sedation  I agree to allow doctor(s) LUCIANO Branham and his/her associates or assistants, including residents and/or other qualified medical practitioner to perform the following medical treatment or procedure and to administer or direct the administration of sedation as necessary:  Procedure(s): REVISION BILATERAL MAXILLARY ANTROSTOMY WITH TISSUE REMOVAL, BIALTERAL ETHMOIDECTOMY WITH SPHENOIDOTOMY WITH TISSUE REMOVAL, FRONTAL SINUS EXPLORATION, BILATERAL SUBMUCOUS TURBINATE RESECTION  My doctor has explained the following regarding the proposed procedure:   the explanation of the procedure   the benefits of the procedure   the potential problems that might occur during recuperation   the risks and side effects of the procedure which could include but are not limited to severe blood loss, infection, stroke or death   the benefits, risks and side effect of alternative procedures including the consequences of declining this procedure or any alternative procedures   the likelihood of achieving satisfactory results. I acknowledge no guarantee or assurance has been made to me regarding the results. I understand that during the course of this treatment/procedure, unforeseen conditions can occur which require an additional or different procedure. I agree to allow my physician or assistants to perform such extension of the original procedure as they may find necessary. I understand that sedation will often result in temporary impairment of memory and fine motor skills and that sedation can occasionally progress to a state of deep sedation or general anesthesia.     I understand the risks of anesthesia for surgery include, but are not limited to, sore throat, hoarseness, injury to face, mouth, or teeth; nausea; headache; injury to blood vessels or nerves; death, brain damage, or paralysis. I understand that if I have a Limitation of Treatment order in effect during my hospitalization, the order may or may not be in effect during this procedure. I give my doctor permission to give me blood or blood products. I understand that there are risks with receiving blood such as hepatitis, AIDS, fever, or allergic reaction. I acknowledge that the risks, benefits, and alternatives of this treatment have been explained to me and that no express or implied warranty has been given by the hospital, any blood bank, or any person or entity as to the blood or blood components transfused. At the discretion of my doctor, I agree to allow observers, equipment/product representatives and allow photographing, and/or televising of the procedure, provided my name or identity is maintained confidentially. I agree the hospital may dispose of or use for scientific or educational purposes any tissue, fluid, or body parts which may be removed.     ________________________________Date________Time______ am/pm  ([facility])  Patient or Signature of Closest Relative or Legal Guardian    ________________________________Date________Time______am/pm      Page 1 of  1  Witness

## 2020-12-21 ENCOUNTER — TELEPHONE (OUTPATIENT)
Dept: ENT CLINIC | Age: 45
End: 2020-12-21

## 2020-12-21 NOTE — TELEPHONE ENCOUNTER
LMOM regarding patients upcoming surgery, she is aware of the time and arrival of her surgery, covid test to be done on 12-22, she also know nothing to eat or drink after midnight the day of surgery

## 2020-12-22 ENCOUNTER — OFFICE VISIT (OUTPATIENT)
Dept: PRIMARY CARE CLINIC | Age: 45
End: 2020-12-22
Payer: COMMERCIAL

## 2020-12-22 LAB — SARS-COV-2: NOT DETECTED

## 2020-12-22 PROCEDURE — 99211 OFF/OP EST MAY X REQ PHY/QHP: CPT | Performed by: NURSE PRACTITIONER

## 2020-12-22 NOTE — PROGRESS NOTES
[facility]           OR 12/28    All patients having surgery or anesthesia are required to be Covid tested. You will need to quarantined from the time you are tested until your surgery. PRIOR TO PROCEDURE DATE:  1. Please follow any guidelines/instructions prior to your procedure as advised by your surgeon. 2. Arrange for someone to drive you home and be with you for the first 24 hours after discharge for your safety after your procedure for which you received sedation. Ensure it is someone we can share information with regarding your discharge. 3. You must contact your surgeon for instructions IF:   You are taking any blood thinners, aspirin, anti-inflammatory or vitamin E.   There is a change in your physical condition such as a cold, fever, rash, cuts, sores or any other infection, especially near your surgical site. 4. Do not drink alcohol the day before or day of your procedure. 5. A Pre-op History and Physical for surgery MUST be completed by your Physician or Urgent Care within 30 days of your procedure date. Please bring a copy with you on the day of your procedure and along with any other testing performed. THE DAY OF YOUR PROCEDURE:  1. Follow instructions for ARRIVAL TIME as DIRECTED BY YOUR SURGEON. I    2. Enter the MAIN entrance from [address] and follow the signs to the free [employer] or [employer] parking (offered free of charge 6am-5pm). 3. Enter the Main Entrance of the hospital (do not enter from the lower level of the parking garage). Upon entrance, check in with the  at the main desk on your left. If no one is available at the desk, proceed into the [facility] Waiting Room and go through the door directly into the [facility]. There is a Check-in desk ACROSS from Room 5 (marked with a sign hanging from the ceiling).  The phone number for the surgery center is [phone]. 4. Please call [phone] option #2 option #2 if you have not been preregistered yet. On the day of your procedure bring your insurance card and photo ID. You will be registered at your bedside once brought back to your room. 5. DO NOT EAT ANYTHING eight hours prior to surgery. May have 8 ounces of water 4 hours prior to surgery. 6. MEDICATIONS    Take the following medications with a SMALL sip of water: ADVAIR, PREDNISONE & BRING RESCUE INHALER   Use your usual dose of inhalers the morning of surgery. BRING your rescue inhaler with you to hospital.    Anesthesia does NOT want you to take insulin the morning of surgery. They will control your blood sugar while you are at the hospital. Please contact your ordering physician for instructions regarding your insulin the night before your procedure. If you have an insulin pump, please keep it set on basal rate. 7. Do not swallow water when brushing teeth. No gum, candy, mints or ice chips. Refrain from smoking or at least decrease the amount. 8. Dress in loose, comfortable clothing appropriate for redressing after your procedure. Do not wear jewelry (including body piercings), make-up (especially NO eye make-up), fingernail polish (NO toenail polish if foot/leg surgery), lotion, powders or metal hairclips. 9. Dentures, glasses, or contacts will need to be removed before your procedure. Bring cases for your glasses, contacts, dentures, or hearing aids to protect them while you are in surgery. 10. If you use a CPAP, please bring it with you on the day of your procedure. 11. We recommend that valuable personal  belongings such as cash, cell phones, e-tablets or jewelry, be left at home during your stay. The hospital will not be responsible for valuables that are not secured in the hospital safe.  However, if your insurance requires a co-pay, you may want to bring a method of payment, i.e. Check or credit card, if you wish to

## 2020-12-24 ENCOUNTER — ANESTHESIA EVENT (OUTPATIENT)
Dept: OPERATING ROOM | Age: 45
End: 2020-12-24
Payer: COMMERCIAL

## 2020-12-28 ENCOUNTER — ANESTHESIA (OUTPATIENT)
Dept: OPERATING ROOM | Age: 45
End: 2020-12-28
Payer: COMMERCIAL

## 2020-12-28 ENCOUNTER — HOSPITAL ENCOUNTER (OUTPATIENT)
Age: 45
Setting detail: OUTPATIENT SURGERY
Discharge: HOME | End: 2020-12-28
Attending: OTOLARYNGOLOGY | Admitting: OTOLARYNGOLOGY
Payer: COMMERCIAL

## 2020-12-28 VITALS
OXYGEN SATURATION: 95 % | WEIGHT: 180 LBS | HEART RATE: 60 BPM | BODY MASS INDEX: 28.25 KG/M2 | RESPIRATION RATE: 16 BRPM | HEIGHT: 67 IN | SYSTOLIC BLOOD PRESSURE: 124 MMHG | DIASTOLIC BLOOD PRESSURE: 78 MMHG | TEMPERATURE: 97 F

## 2020-12-28 VITALS — OXYGEN SATURATION: 95 % | SYSTOLIC BLOOD PRESSURE: 126 MMHG | TEMPERATURE: 98.4 F | DIASTOLIC BLOOD PRESSURE: 62 MMHG

## 2020-12-28 PROBLEM — J34.3 NASAL TURBINATE HYPERTROPHY: Status: ACTIVE | Noted: 2020-12-28

## 2020-12-28 LAB — PREGNANCY, URINE: NEGATIVE

## 2020-12-28 PROCEDURE — 7100000011 HC PHASE II RECOVERY - ADDTL 15 MIN: Performed by: OTOLARYNGOLOGY

## 2020-12-28 PROCEDURE — 6360000002 HC RX W HCPCS: Performed by: ANESTHESIOLOGY

## 2020-12-28 PROCEDURE — 6360000002 HC RX W HCPCS: Performed by: NURSE ANESTHETIST, CERTIFIED REGISTERED

## 2020-12-28 PROCEDURE — 7100000000 HC PACU RECOVERY - FIRST 15 MIN: Performed by: OTOLARYNGOLOGY

## 2020-12-28 PROCEDURE — 61782 SCAN PROC CRANIAL EXTRA: CPT | Performed by: OTOLARYNGOLOGY

## 2020-12-28 PROCEDURE — 3600000014 HC SURGERY LEVEL 4 ADDTL 15MIN: Performed by: OTOLARYNGOLOGY

## 2020-12-28 PROCEDURE — 31259 NSL/SINS NDSC SPHN TISS RMVL: CPT | Performed by: OTOLARYNGOLOGY

## 2020-12-28 PROCEDURE — 3700000000 HC ANESTHESIA ATTENDED CARE: Performed by: OTOLARYNGOLOGY

## 2020-12-28 PROCEDURE — 31276 NSL/SINS NDSC FRNT TISS RMVL: CPT | Performed by: OTOLARYNGOLOGY

## 2020-12-28 PROCEDURE — 3700000001 HC ADD 15 MINUTES (ANESTHESIA): Performed by: OTOLARYNGOLOGY

## 2020-12-28 PROCEDURE — 3600000004 HC SURGERY LEVEL 4 BASE: Performed by: OTOLARYNGOLOGY

## 2020-12-28 PROCEDURE — 6370000000 HC RX 637 (ALT 250 FOR IP): Performed by: NURSE ANESTHETIST, CERTIFIED REGISTERED

## 2020-12-28 PROCEDURE — 2580000003 HC RX 258: Performed by: OTOLARYNGOLOGY

## 2020-12-28 PROCEDURE — 84703 CHORIONIC GONADOTROPIN ASSAY: CPT

## 2020-12-28 PROCEDURE — 2720000010 HC SURG SUPPLY STERILE: Performed by: OTOLARYNGOLOGY

## 2020-12-28 PROCEDURE — C2625 STENT, NON-COR, TEM W/DEL SY: HCPCS | Performed by: OTOLARYNGOLOGY

## 2020-12-28 PROCEDURE — 2580000003 HC RX 258: Performed by: ANESTHESIOLOGY

## 2020-12-28 PROCEDURE — 6370000000 HC RX 637 (ALT 250 FOR IP): Performed by: OTOLARYNGOLOGY

## 2020-12-28 PROCEDURE — 31267 ENDOSCOPY MAXILLARY SINUS: CPT | Performed by: OTOLARYNGOLOGY

## 2020-12-28 PROCEDURE — 88305 TISSUE EXAM BY PATHOLOGIST: CPT

## 2020-12-28 PROCEDURE — 2500000003 HC RX 250 WO HCPCS: Performed by: NURSE ANESTHETIST, CERTIFIED REGISTERED

## 2020-12-28 PROCEDURE — 7100000010 HC PHASE II RECOVERY - FIRST 15 MIN: Performed by: OTOLARYNGOLOGY

## 2020-12-28 PROCEDURE — 2709999900 HC NON-CHARGEABLE SUPPLY: Performed by: OTOLARYNGOLOGY

## 2020-12-28 PROCEDURE — 7100000001 HC PACU RECOVERY - ADDTL 15 MIN: Performed by: OTOLARYNGOLOGY

## 2020-12-28 PROCEDURE — 30140 RESECT INFERIOR TURBINATE: CPT | Performed by: OTOLARYNGOLOGY

## 2020-12-28 RX ORDER — LIDOCAINE HYDROCHLORIDE 40 MG/ML
SOLUTION TOPICAL PRN
Status: DISCONTINUED | OUTPATIENT
Start: 2020-12-28 | End: 2020-12-28 | Stop reason: SDUPTHER

## 2020-12-28 RX ORDER — ONDANSETRON 2 MG/ML
4 INJECTION INTRAMUSCULAR; INTRAVENOUS
Status: DISCONTINUED | OUTPATIENT
Start: 2020-12-28 | End: 2020-12-28 | Stop reason: HOSPADM

## 2020-12-28 RX ORDER — SODIUM CHLORIDE 0.9 % (FLUSH) 0.9 %
10 SYRINGE (ML) INJECTION PRN
Status: DISCONTINUED | OUTPATIENT
Start: 2020-12-28 | End: 2020-12-28 | Stop reason: HOSPADM

## 2020-12-28 RX ORDER — ROCURONIUM BROMIDE 10 MG/ML
INJECTION, SOLUTION INTRAVENOUS PRN
Status: DISCONTINUED | OUTPATIENT
Start: 2020-12-28 | End: 2020-12-28 | Stop reason: SDUPTHER

## 2020-12-28 RX ORDER — ENALAPRILAT 2.5 MG/2ML
1.25 INJECTION INTRAVENOUS
Status: DISCONTINUED | OUTPATIENT
Start: 2020-12-28 | End: 2020-12-28 | Stop reason: HOSPADM

## 2020-12-28 RX ORDER — METHYLPREDNISOLONE SODIUM SUCCINATE 125 MG/2ML
125 INJECTION, POWDER, LYOPHILIZED, FOR SOLUTION INTRAMUSCULAR; INTRAVENOUS ONCE
Status: COMPLETED | OUTPATIENT
Start: 2020-12-28 | End: 2020-12-28

## 2020-12-28 RX ORDER — FENTANYL CITRATE 50 UG/ML
50 INJECTION, SOLUTION INTRAMUSCULAR; INTRAVENOUS EVERY 5 MIN PRN
Status: DISCONTINUED | OUTPATIENT
Start: 2020-12-28 | End: 2020-12-28 | Stop reason: HOSPADM

## 2020-12-28 RX ORDER — MAGNESIUM HYDROXIDE 1200 MG/15ML
LIQUID ORAL CONTINUOUS PRN
Status: COMPLETED | OUTPATIENT
Start: 2020-12-28 | End: 2020-12-28

## 2020-12-28 RX ORDER — SODIUM CHLORIDE, SODIUM LACTATE, POTASSIUM CHLORIDE, CALCIUM CHLORIDE 600; 310; 30; 20 MG/100ML; MG/100ML; MG/100ML; MG/100ML
INJECTION, SOLUTION INTRAVENOUS CONTINUOUS
Status: DISCONTINUED | OUTPATIENT
Start: 2020-12-28 | End: 2020-12-28 | Stop reason: HOSPADM

## 2020-12-28 RX ORDER — HYDROCODONE BITARTRATE AND ACETAMINOPHEN 5; 325 MG/1; MG/1
1 TABLET ORAL
Qty: 20 TABLET | Refills: 0 | Status: SHIPPED | OUTPATIENT
Start: 2020-12-28 | End: 2021-01-02

## 2020-12-28 RX ORDER — LIDOCAINE HYDROCHLORIDE 20 MG/ML
INJECTION, SOLUTION INTRAVENOUS PRN
Status: DISCONTINUED | OUTPATIENT
Start: 2020-12-28 | End: 2020-12-28 | Stop reason: SDUPTHER

## 2020-12-28 RX ORDER — PROPOFOL 10 MG/ML
INJECTION, EMULSION INTRAVENOUS PRN
Status: DISCONTINUED | OUTPATIENT
Start: 2020-12-28 | End: 2020-12-28 | Stop reason: SDUPTHER

## 2020-12-28 RX ORDER — OXYMETAZOLINE HYDROCHLORIDE 0.05 G/100ML
SPRAY NASAL PRN
Status: DISCONTINUED | OUTPATIENT
Start: 2020-12-28 | End: 2020-12-28 | Stop reason: ALTCHOICE

## 2020-12-28 RX ORDER — SODIUM CHLORIDE 0.9 % (FLUSH) 0.9 %
10 SYRINGE (ML) INJECTION EVERY 12 HOURS SCHEDULED
Status: DISCONTINUED | OUTPATIENT
Start: 2020-12-28 | End: 2020-12-28 | Stop reason: HOSPADM

## 2020-12-28 RX ORDER — MIDAZOLAM HYDROCHLORIDE 1 MG/ML
INJECTION INTRAMUSCULAR; INTRAVENOUS PRN
Status: DISCONTINUED | OUTPATIENT
Start: 2020-12-28 | End: 2020-12-28 | Stop reason: SDUPTHER

## 2020-12-28 RX ORDER — FENTANYL CITRATE 50 UG/ML
INJECTION, SOLUTION INTRAMUSCULAR; INTRAVENOUS PRN
Status: DISCONTINUED | OUTPATIENT
Start: 2020-12-28 | End: 2020-12-28 | Stop reason: SDUPTHER

## 2020-12-28 RX ORDER — LIDOCAINE HYDROCHLORIDE AND EPINEPHRINE 10; 10 MG/ML; UG/ML
INJECTION, SOLUTION INFILTRATION; PERINEURAL PRN
Status: DISCONTINUED | OUTPATIENT
Start: 2020-12-28 | End: 2020-12-28 | Stop reason: ALTCHOICE

## 2020-12-28 RX ORDER — LABETALOL 20 MG/4 ML (5 MG/ML) INTRAVENOUS SYRINGE
5 EVERY 10 MIN PRN
Status: DISCONTINUED | OUTPATIENT
Start: 2020-12-28 | End: 2020-12-28 | Stop reason: HOSPADM

## 2020-12-28 RX ORDER — HYDRALAZINE HYDROCHLORIDE 20 MG/ML
5 INJECTION INTRAMUSCULAR; INTRAVENOUS EVERY 5 MIN PRN
Status: DISCONTINUED | OUTPATIENT
Start: 2020-12-28 | End: 2020-12-28 | Stop reason: HOSPADM

## 2020-12-28 RX ORDER — LIDOCAINE HYDROCHLORIDE 10 MG/ML
1 INJECTION, SOLUTION EPIDURAL; INFILTRATION; INTRACAUDAL; PERINEURAL
Status: DISCONTINUED | OUTPATIENT
Start: 2020-12-28 | End: 2020-12-28 | Stop reason: HOSPADM

## 2020-12-28 RX ORDER — APREPITANT 40 MG/1
40 CAPSULE ORAL ONCE
Status: COMPLETED | OUTPATIENT
Start: 2020-12-28 | End: 2020-12-28

## 2020-12-28 RX ORDER — FENTANYL CITRATE 50 UG/ML
25 INJECTION, SOLUTION INTRAMUSCULAR; INTRAVENOUS EVERY 5 MIN PRN
Status: DISCONTINUED | OUTPATIENT
Start: 2020-12-28 | End: 2020-12-28 | Stop reason: HOSPADM

## 2020-12-28 RX ORDER — ALBUTEROL SULFATE 90 UG/1
AEROSOL, METERED RESPIRATORY (INHALATION) PRN
Status: DISCONTINUED | OUTPATIENT
Start: 2020-12-28 | End: 2020-12-28 | Stop reason: SDUPTHER

## 2020-12-28 RX ADMIN — HYDROMORPHONE HYDROCHLORIDE 0.25 MG: 1 INJECTION, SOLUTION INTRAMUSCULAR; INTRAVENOUS; SUBCUTANEOUS at 10:47

## 2020-12-28 RX ADMIN — SODIUM CHLORIDE, SODIUM LACTATE, POTASSIUM CHLORIDE, AND CALCIUM CHLORIDE: .6; .31; .03; .02 INJECTION, SOLUTION INTRAVENOUS at 09:01

## 2020-12-28 RX ADMIN — ALBUTEROL SULFATE 2 PUFF: 90 AEROSOL, METERED RESPIRATORY (INHALATION) at 07:32

## 2020-12-28 RX ADMIN — FENTANYL CITRATE 50 MCG: 50 INJECTION INTRAMUSCULAR; INTRAVENOUS at 07:37

## 2020-12-28 RX ADMIN — FAMOTIDINE 20 MG: 10 INJECTION, SOLUTION INTRAVENOUS at 08:12

## 2020-12-28 RX ADMIN — LIDOCAINE HYDROCHLORIDE 20 MG: 20 INJECTION, SOLUTION INTRAVENOUS at 07:33

## 2020-12-28 RX ADMIN — FENTANYL CITRATE 50 MCG: 50 INJECTION INTRAMUSCULAR; INTRAVENOUS at 08:29

## 2020-12-28 RX ADMIN — APREPITANT 40 MG: 40 CAPSULE ORAL at 12:13

## 2020-12-28 RX ADMIN — FENTANYL CITRATE 50 MCG: 50 INJECTION INTRAMUSCULAR; INTRAVENOUS at 07:35

## 2020-12-28 RX ADMIN — FENTANYL CITRATE 50 MCG: 50 INJECTION INTRAMUSCULAR; INTRAVENOUS at 07:38

## 2020-12-28 RX ADMIN — ROCURONIUM BROMIDE 50 MG: 10 INJECTION INTRAVENOUS at 07:33

## 2020-12-28 RX ADMIN — LIDOCAINE HYDROCHLORIDE 160 MG: 40 SOLUTION TOPICAL at 07:33

## 2020-12-28 RX ADMIN — SODIUM CHLORIDE, SODIUM LACTATE, POTASSIUM CHLORIDE, AND CALCIUM CHLORIDE: .6; .31; .03; .02 INJECTION, SOLUTION INTRAVENOUS at 07:04

## 2020-12-28 RX ADMIN — FENTANYL CITRATE 50 MCG: 50 INJECTION INTRAMUSCULAR; INTRAVENOUS at 07:33

## 2020-12-28 RX ADMIN — FENTANYL CITRATE 50 MCG: 50 INJECTION INTRAMUSCULAR; INTRAVENOUS at 08:26

## 2020-12-28 RX ADMIN — METHYLPREDNISOLONE SODIUM SUCCINATE 125 MG: 125 INJECTION, POWDER, FOR SOLUTION INTRAMUSCULAR; INTRAVENOUS at 09:21

## 2020-12-28 RX ADMIN — PROPOFOL 200 MG: 10 INJECTION, EMULSION INTRAVENOUS at 07:33

## 2020-12-28 RX ADMIN — MIDAZOLAM HYDROCHLORIDE 2 MG: 2 INJECTION, SOLUTION INTRAMUSCULAR; INTRAVENOUS at 07:25

## 2020-12-28 ASSESSMENT — PULMONARY FUNCTION TESTS
PIF_VALUE: 20
PIF_VALUE: 19
PIF_VALUE: 0
PIF_VALUE: 19
PIF_VALUE: 20
PIF_VALUE: 20
PIF_VALUE: 0
PIF_VALUE: 19
PIF_VALUE: 0
PIF_VALUE: 20
PIF_VALUE: 19
PIF_VALUE: 1
PIF_VALUE: 19
PIF_VALUE: 20
PIF_VALUE: 0
PIF_VALUE: 19
PIF_VALUE: 19
PIF_VALUE: 20
PIF_VALUE: 19
PIF_VALUE: 0
PIF_VALUE: 0
PIF_VALUE: 23
PIF_VALUE: 20
PIF_VALUE: 19
PIF_VALUE: 20
PIF_VALUE: 20
PIF_VALUE: 16
PIF_VALUE: 20
PIF_VALUE: 20
PIF_VALUE: 19
PIF_VALUE: 20
PIF_VALUE: 7
PIF_VALUE: 0
PIF_VALUE: 20
PIF_VALUE: 20
PIF_VALUE: 14
PIF_VALUE: 20
PIF_VALUE: 19
PIF_VALUE: 19
PIF_VALUE: 0
PIF_VALUE: 19
PIF_VALUE: 19
PIF_VALUE: 20
PIF_VALUE: 20
PIF_VALUE: 17
PIF_VALUE: 0
PIF_VALUE: 19
PIF_VALUE: 19
PIF_VALUE: 20
PIF_VALUE: 20
PIF_VALUE: 19
PIF_VALUE: 15
PIF_VALUE: 0
PIF_VALUE: 20
PIF_VALUE: 19
PIF_VALUE: 0
PIF_VALUE: 19
PIF_VALUE: 0
PIF_VALUE: 20
PIF_VALUE: 13
PIF_VALUE: 19
PIF_VALUE: 0
PIF_VALUE: 19
PIF_VALUE: 20
PIF_VALUE: 19
PIF_VALUE: 19
PIF_VALUE: 20
PIF_VALUE: 1
PIF_VALUE: 16
PIF_VALUE: 19
PIF_VALUE: 20
PIF_VALUE: 19
PIF_VALUE: 0
PIF_VALUE: 19
PIF_VALUE: 1
PIF_VALUE: 20
PIF_VALUE: 19
PIF_VALUE: 24
PIF_VALUE: 20
PIF_VALUE: 1
PIF_VALUE: 19
PIF_VALUE: 20
PIF_VALUE: 0
PIF_VALUE: 14
PIF_VALUE: 20
PIF_VALUE: 0
PIF_VALUE: 20
PIF_VALUE: 19
PIF_VALUE: 20
PIF_VALUE: 19
PIF_VALUE: 0
PIF_VALUE: 19
PIF_VALUE: 0
PIF_VALUE: 18
PIF_VALUE: 24
PIF_VALUE: 19
PIF_VALUE: 19
PIF_VALUE: 20
PIF_VALUE: 0
PIF_VALUE: 19
PIF_VALUE: 19
PIF_VALUE: 20
PIF_VALUE: 0
PIF_VALUE: 19
PIF_VALUE: 20
PIF_VALUE: 0
PIF_VALUE: 19

## 2020-12-28 ASSESSMENT — PAIN DESCRIPTION - DESCRIPTORS
DESCRIPTORS: ACHING;NAGGING;DISCOMFORT
DESCRIPTORS: CONSTANT;NAGGING;ACHING
DESCRIPTORS: ACHING

## 2020-12-28 ASSESSMENT — PAIN SCALES - GENERAL
PAINLEVEL_OUTOF10: 5
PAINLEVEL_OUTOF10: 1
PAINLEVEL_OUTOF10: 1
PAINLEVEL_OUTOF10: 6
PAINLEVEL_OUTOF10: 4

## 2020-12-28 ASSESSMENT — PAIN - FUNCTIONAL ASSESSMENT
PAIN_FUNCTIONAL_ASSESSMENT: 0-10
PAIN_FUNCTIONAL_ASSESSMENT: 0-10

## 2020-12-28 ASSESSMENT — PAIN DESCRIPTION - LOCATION
LOCATION: MOUTH

## 2020-12-28 ASSESSMENT — PAIN DESCRIPTION - PROGRESSION: CLINICAL_PROGRESSION: GRADUALLY IMPROVING

## 2020-12-28 ASSESSMENT — PAIN DESCRIPTION - PAIN TYPE: TYPE: SURGICAL PAIN

## 2020-12-28 NOTE — PROGRESS NOTES
Ambulatory Surgery/Procedure Discharge Note    Vitals:    12/28/20 1131   BP: 124/78   Pulse: 60   Resp: 16   Temp: 97 °F (36.1 °C)   SpO2: 95%       In: 1395 [P.O.:280; I.V.:1115]  Out: -     Restroom use offered before discharge. Yes, voided per bathroom    Pain assessment:  present - adequately treated  Pain Level: 1    Nausea easing up, pain tolerable, no drainage noted from nose. States would like to go home. Instructions already given to . Patient discharged to home/self care.  Patient discharged via wheel chair by transporter to waiting family/S.O.       12/28/2020 12:43 PM

## 2020-12-28 NOTE — H&P
David James    [id number]    [facility], [facility] Update H & P  Department of General Surgery   Surgical Service   Pre-operative History and Physical  Last H & P within the last 30 days. DIAGNOSIS:   Nasal polyposis [J33.9]  Anosmia [R43.0]  Hypertrophy, nasal, turbinate [J34.3]  Chronic pansinusitis [J32.4]    Procedure(s):  REVISION BILATERAL MAXILLARY ANTROSTOMY WITH TISSUE REMOVAL, BILATERAL ETHMOIDECTOMY WITH SPHENOIDOTOMY WITH TISSUE REMOVAL, FRONTAL SINUS EXPLORATION, BILATERAL SUBMUCOUS TURBINATE RESECTION     HISTORY OF PRESENT ILLNESS:   Patient with c/o anosmia, congestion, sinus pressure and postnasal drip which has been unrelieved with medical therapy. Covid 19:  Patient denies fever, chills, cough or known exposure to Covid-19. Past Medical History:        Diagnosis Date    Allergic rhinitis     Asthma     being evaluated 4/2016    Meniere disease      Past Surgical History:        Procedure Laterality Date    DILATION AND CURETTAGE OF UTERUS      ENDOSCOPY, COLON, DIAGNOSTIC  4-    bronchscopy w/ BAL    SINUS SURGERY Bilateral 04/26/2017    BILATERAL FRONTAL SINUSOTOMIES WITH REMOVAL OF CONTENTS, BILATERAL ETHMOIDECTOMIES WITH REMOVAL OF CONTENTS, BILATERAL MAXILLARY ANTHROSTOMIES WITH REMOVAL OF CONTENTS     Past Social History:  Social History     Socioeconomic History    Marital status:      Spouse name: None    Number of children: None    Years of education: None    Highest education level: None   Occupational History    None   Social Needs    Financial resource strain: None    Food insecurity     Worry: None     Inability: None    Transportation needs     Medical: None     Non-medical: None   Tobacco Use    Smoking status: Never Smoker    Smokeless tobacco: Never Used   Substance and Sexual Activity    Alcohol use:  Yes     Alcohol/week: 2.0 standard drinks     Types: 2 Cans of beer per week     Comment: 2 beers a week OR <    Drug use: No    Sexual activity: None   Lifestyle    Physical activity     Days per week: None     Minutes per session: None    Stress: None   Relationships    Social connections     Talks on phone: None     Gets together: None     Attends [employer] service: None     Active member of club or organization: None     Attends meetings of clubs or organizations: None     Relationship status: None    Intimate partner violence     Fear of current or ex partner: None     Emotionally abused: None     Physically abused: None     Forced sexual activity: None   Other Topics Concern    None   Social History Narrative    None         Medications Prior to Admission:      Prior to Admission medications    Medication Sig Start Date End Date Taking? Authorizing Provider   Multiple Vitamin (MULTIVITAMIN PO) Take 1 tablet by mouth daily   Yes Historical Provider, MD   NUCALA 100 MG SOLR injection Inject 300 mg into the skin every 30 days  9/5/17  Yes Historical Provider, MD   predniSONE (DELTASONE) 10 MG tablet Take 2 tablets by mouth daily 2/13/17  Yes Mela Dumont MD   PROAIR  (90 BASE) MCG/ACT inhaler INHALE TWO PUFFS BY MOUTH FOUR TIMES A DAY 1/4/17  Yes Mihir Alvarez MD   fluticasone-salmeterol (ADVAIR HFA) 230-21 MCG/ACT inhaler Inhale 2 puffs into the lungs 2 times daily 2/26/16  Yes Lorenzo Carrera MD   albuterol (PROVENTIL) (2.5 MG/3ML) 0.083% nebulizer solution Take 2.5 mg by nebulization every 4 hours as needed  8/24/17   Historical Provider, MD   Respiratory Therapy Supplies (NEBULIZER/TUBING/MOUTHPIECE) KIT 1 kit by Does not apply route daily as needed Dx: Asthma Severe       ICD-10: J45.909 4/20/16   Mihir Alvarez MD   Misc.  Devices ([employer]) MISC To use after neb treatment 4/15/16   Mihir Alvarez MD         Allergies:  Cat hair extract and Nickel    PHYSICAL EXAM:      /75   Pulse 65   Temp 98.1 °F (36.7 °C) (Oral)   Resp 16   Ht 5' 7\" (1.702 m)   Wt 180 lb (81.6 kg)   LMP 12/16/2020   SpO2 95% Breastfeeding No   BMI 28.19 kg/m²      Airway:  Airway patent with no audible stridor    Heart:  regular rate and rhythm, No murmur noted    Lungs:  No increased work of breathing, good air exchange, clear to auscultation bilaterally, no crackles or wheezing    Abdomen:  soft, non-distended, non-tender, no rebound tenderness or guarding, normal active bowel sounds and no masses palpated    ASSESSMENT AND PLAN     Patient is a 39 y.o. female with above specified procedure planned. 1.  Patient seen and focused exam done today- no new changes since last physical exam on 12/23/20    2. Access to ancillary services are available per request of the provider.     MARIA R Vivas - CNP     12/28/2020

## 2020-12-28 NOTE — ANESTHESIA PRE PROCEDURE
 sodium chloride flush 0.9 % injection 10 mL  10 mL Intravenous 2 times per day Dalila Nunes MD        sodium chloride flush 0.9 % injection 10 mL  10 mL Intravenous PRN Dalila Nunes MD        lidocaine PF 1 % injection 1 mL  1 mL Intradermal Once PRN Dalila Nunes MD        methylPREDNISolone sodium (SOLU-MEDROL) injection 125 mg  125 mg Intravenous Once Dalila Nunes MD           Allergies: Allergies   Allergen Reactions    Cat Hair Extract      CATS- ITCHY, WATERY EYES, SNEEZING    Nickel Rash     Skin sensitive to inexpensive earrings       Problem List:    Patient Active Problem List   Diagnosis Code    Severe asthma J45.909    Granulomatous lung disease ([facility] J84.10    Gastroesophageal reflux disease K21.9    Abnormal CT scan, chest R93.89    ILD (interstitial lung disease) ([facility] J84.9    Acute respiratory failure with hypoxia ([facility] J96.01    Influenza with respiratory manifestation other than pneumonia J11.1    Hypoxia R09.02    Severe persistent asthma with acute exacerbation J45.51    Sinusitis with nasal polyps J32.9, J33.9    Chronic pansinusitis J32.4    Multigravida Z64.1    Late pregnancy [id number]    Bleeding postpartum O72.1       Past Medical History:        Diagnosis Date    Allergic rhinitis     Asthma     being evaluated 4/2016    Meniere disease        Past Surgical History:        Procedure Laterality Date    DILATION AND CURETTAGE OF UTERUS      ENDOSCOPY, COLON, DIAGNOSTIC  4-    bronchscopy w/ BAL    SINUS SURGERY Bilateral 04/26/2017    BILATERAL FRONTAL SINUSOTOMIES WITH REMOVAL OF CONTENTS, BILATERAL ETHMOIDECTOMIES WITH REMOVAL OF CONTENTS, BILATERAL MAXILLARY ANTHROSTOMIES WITH REMOVAL OF CONTENTS       Social History:    Social History     Tobacco Use    Smoking status: Never Smoker    Smokeless tobacco: Never Used   Substance Use Topics    Alcohol use:  Yes     Alcohol/week: 2.0 standard drinks     Types: 2 Cans of beer per week Comment: 2 beers a week OR <                                Counseling given: Not Answered      Vital Signs (Current):   Vitals:    12/22/20 1426 12/28/20 0535   BP:  113/75   Pulse:  65   Resp:  16   Temp:  98.1 °F (36.7 °C)   TempSrc:  Oral   SpO2:  95%   Weight: 180 lb (81.6 kg) 180 lb (81.6 kg)   Height: 5' 7\" (1.702 m) 5' 7\" (1.702 m)                                              BP Readings from Last 3 Encounters:   12/28/20 113/75   12/04/20 (!) 122/59   11/15/19 100/70       NPO Status: Time of last liquid consumption: 0400(sip H2O with meds)                        Time of last solid consumption: 1800                        Date of last liquid consumption: 12/28/20                        Date of last solid food consumption: 12/28/20    BMI:   Wt Readings from Last 3 Encounters:   12/28/20 180 lb (81.6 kg)   12/04/20 185 lb (83.9 kg)   11/15/19 175 lb (79.4 kg)     Body mass index is 28.19 kg/m². CBC:   Lab Results   Component Value Date    WBC 10.7 07/20/2020    RBC 4.73 07/20/2020    HGB 13.8 07/20/2020    HCT 41.8 07/20/2020    MCV 88.3 07/20/2020    RDW 14.1 07/20/2020     07/20/2020       CMP:   Lab Results   Component Value Date     10/31/2018    K 4.2 10/31/2018     10/31/2018    CO2 25 10/31/2018    BUN 12 10/31/2018    CREATININE 0.8 10/31/2018    GFRAA >60 10/31/2018    AGRATIO 1.7 10/31/2018    LABGLOM >60 10/31/2018    GLUCOSE 84 10/31/2018    PROT 6.5 10/31/2018    CALCIUM 9.5 10/31/2018    BILITOT 0.5 10/31/2018    ALKPHOS 55 10/31/2018    AST 11 10/31/2018    ALT 14 10/31/2018       POC Tests: No results for input(s): POCGLU, POCNA, POCK, POCCL, POCBUN, POCHEMO, POCHCT in the last 72 hours.     Coags:   Lab Results   Component Value Date    PROTIME 12.2 04/21/2016    INR 1.07 04/21/2016    APTT 32.1 04/21/2016       HCG (If Applicable):   Lab Results   Component Value Date    PREGTESTUR Negative 12/28/2020 ABGs: No results found for: PHART, PO2ART, FGZ6WSK, RKX1UTH, BEART, L7EJWWQP     Type & Screen (If Applicable):  No results found for: LABABO, LABRH    Drug/Infectious Status (If Applicable):  No results found for: HIV, HEPCAB    COVID-19 Screening (If Applicable):   Lab Results   Component Value Date    COVID19 Not Detected 12/22/2020         Anesthesia Evaluation  Patient summary reviewed and Nursing notes reviewed  Airway: Mallampati: II  TM distance: >3 FB   Neck ROM: full  Mouth opening: > = 3 FB Dental: normal exam         Pulmonary:   (+) asthma:                           ROS comment: Granulomatous lung disease ([address]  ILD (interstitial lung disease) ([address]   Cardiovascular:Negative CV ROS                      Neuro/Psych:   Negative Neuro/Psych ROS              GI/Hepatic/Renal:   (+) GERD:,           Endo/Other: Negative Endo/Other ROS                    Abdominal:           Vascular: negative vascular ROS. Anesthesia Plan      general     ASA 2       Induction: intravenous. Anesthetic plan and risks discussed with patient.                       Tim Moreno MD   12/28/2020

## 2020-12-28 NOTE — ANESTHESIA POSTPROCEDURE EVALUATION
Department of Anesthesiology  Postprocedure Note    Patient: Danita Chaudhry  MRN: [id number]  YOB: 1975  Date of evaluation: 12/28/2020  Time:  9:36 AM     Procedure Summary     Date: 12/28/20 Room / Location: [address] [place] [address] [address]    Anesthesia Start: 5443 Anesthesia Stop:     Procedure: REVISION BILATERAL MAXILLARY ANTROSTOMY WITH TISSUE REMOVAL, BILATERAL ETHMOIDECTOMY WITH SPHENOIDOTOMY WITH TISSUE REMOVAL, FRONTAL SINUS EXPLORATION, BILATERAL SUBMUCOUS TURBINATE RESECTION (Bilateral ) Diagnosis:       Nasal polyposis      Anosmia      Hypertrophy, nasal, turbinate      Chronic pansinusitis      (Nasal polyposis [J33.9] Anosmia [R43.0] Hypertrophy, nasal, turbinate [J34.3] Chronic pansinusitis [J32.4])    Surgeons: Mireya Cortez DO Responsible Provider: Steph Bueno MD    Anesthesia Type: general ASA Status: 2          Anesthesia Type: general    Madhuri Phase I: Madhuri Score: 8    Madhuri Phase II:      Last vitals: Reviewed and per EMR flowsheets.        Anesthesia Post Evaluation    Patient location during evaluation: PACU  Patient participation: complete - patient participated  Level of consciousness: awake  Airway patency: patent  Nausea & Vomiting: no nausea and no vomiting  Complications: no  Cardiovascular status: hemodynamically stable  Respiratory status: face mask  Hydration status: stable

## 2020-12-28 NOTE — PROGRESS NOTES
PACU Transfer to [facility]  Pt's Current Allergies: Cat hair extract and Nickel    Pt meets criteria to transfer to next phase of care per DULCE MARIA SCORE and ASPAN standards    No results for input(s): POCGLU in the last 72 hours. Vitals:    12/28/20 1025   BP: 137/81   Pulse: 69   Resp: 16   Temp: 97.9 °F (36.6 °C)   SpO2: 95%      BP within 20% of pt's admitting BP as per Dulce Maria Score      Intake/Output Summary (Last 24 hours) at 12/28/2020 1029  Last data filed at 12/28/2020 1025  Gross per 24 hour   Intake 1395 ml   Output --   Net 1395 ml         Pain assessment:  present -   Pain Level: (tolerable)    Patient was assessed for unknown alterations to skin integrity. There were not unknown alterations observed. Patient transferred to care of Balwinder Peña RN.  Handoff sheet  Family updated and in parking garage    12/28/2020 10:29 AM

## 2020-12-28 NOTE — OP NOTE
Patient Name: Óscar Bailey  YOB: 1975  Medical Record Number:  [id number]  Billing Number:  [id number]  Date of Procedure: 12/28/2020  Time: 0730      Pre-operative Diagnosis: 1. Chronic pansinusitis 2. Nasal polyposis 3. Anosmia 4. Bilateral inferior turban hypertrophy  Post-operative Diagnosis: Same  Proc: 1. Bilaertal nasal endoscopy with maxillary antrosotomy with tissue removal (tis [id number])   2. Bilateral nasal endoscopy with total ethmoidectomy   3. Bilateralnasal endoscopy with frontal sinus exploration (20599)   4. Bilateral nasal endoscopy with sphenoidotomy (eth tis 13150)    5. Stereotactic computer assisted surgery (27003)   6. Bilateral submucous turbinate resection    Surgeon:  Anirudh Mart,   OR Staff: Circulator: Reagan Chiu RN  Scrub Person First: Yovani Rivera RN  Circulator Assist: Tino Lindsey    Anesthesia: General endotracheal  1. Afrin and 4% lidocaine topical in the nasal cavity and on pledgets in the middle meatus        EBL: <100ml    Specimens: Bilateral sinus contents    Findings: Significant nasal polyposis throughout the entirety of the nasal cavities bilaterally including maxillary, ethmoid, frontal and sphenoid cavities. No evidence of any mass or tumor. Complications: none    Brief History: This is a 40 y/o female with history of nasal polyps. Underwent surgery for chronic sinusitis and nasal polyps within the past 5 to 10 years. Has been experiencing recurrence of symptoms of nasal congestion, anosmia, obstruction, postnasal drainage, rhinorrhea. Nasal endoscopy and CT revealed pansinusitis with nasal polyposis. Recommended revision surgery. Risks and benefits discussed. Description:    After consent was confirmed the patient came into the operating room and was placed in supine position. General IV anesthesia was obtained and the patient intubated by Anesthesiology without difficulty. Head there is rotated counterclockwise 90 degrees. Patient was prepped and draped in standard fashion. The nose was packed with Afrin-soaked cottonoids. The [facility] fusion image navigation was set up and calibrated and found to be functioning properly. A proper timeout was performed. Surgery began on the left nasal cavity. Under endoscopic visualization using a 0 degree 4 mm rigid nasal endoscope, the Tricut sinus shaver was used to remove the nasal polyps emanating throughout the nasal cavity. Afterward, using a zero degree endoscope and and a [place] elevator the middle turbinate was medialized in its inferior third. The uncinate remnant was in fractured with a frontal sinus seeker. A Charmaine's window was made with a back biting forceps. The uncinate was removed with a 4mm, 0 degree straight shot micro debrider in its entirety from the inferior turbinate to the skull base. The natural ostium of the maxillary sinus was identified and back-fractured through the fontanelle with a frontal sinus seeker. A large maxillary antrostomy was then performed with the 0 degree micro debrider. Significant amount of polyp tissue was removed maxillary sinus. A 0 degree micro-debrider was used to remove the ethmoid bulla lateral to the lamina papyracea , superior to the skull base and posterior to the basal lamella. A large window was made in the lamella preserving an inferior 1 cm strut. The posterior ethmoids were then removed with the micro debrider lateral to the lamina, posterior to the anterior wall of the sphenoid sinus and superior to the skull base. 1:1000 topical epinephrine pledgets were occasionally placed for hemostasis. The natural ostium of the sphenoid was identified with a [place] elevator, entered and down-fractured in a medial and inferior direction. A large sphenoidotomy was created with Kerrison rongeurs and the micro debrider. Polyps were removed from the nasal cavities bilaterally.  1:1000 topical epinephrine pledgets were occasionally placed for hemostasis. Using a combination of 0 and 45 degree endoscopes the agar nasi cell and frontal recess was opened into the frontal sinus with 0 degree micro debriders. This allowed full visualization into the frontal recess and sinus. 1:1000 topical epinephrine pledgets were placed for hemostasis. Attention was turned to the right side and the same procedure was performed with similar findings. At this time we performed a posterior turbinate resection as the posterior aspect of the inferior turbinates had significant polypoid degeneration. This was done using the Tricut shaver. Then the 2 mm turbinate shaver was used to resect submucosal turbinate tissue bilaterally. The inferior turbinates were outfractured bilaterally using a long nasal speculum. Pledgets were removed and the wounds were thoroughly irrigated with sterile saline. Propel splints were placed in the ethmoid cavities bilaterally. Posisep sinus splints were placed between the middle turbinates and lateral sinus wall on the bilateral. The splints were saturated with sterile saline solution. There was no further significant bleeding. The patient was then awoken from general anesthesia, extubated and sent to the PACU in stable condition.      Electronically signed by Aracelis Garcia DO on 12/28/20 at 9:27 AM EST

## 2020-12-28 NOTE — BRIEF OP NOTE
Brief Postoperative Note      Patient: Lindy Doty  YOB: 1975  MRN: [id number]    Date of Procedure: 12/28/2020    Pre-Op Diagnosis: Nasal polyposis [J33.9] Anosmia [R43.0] Hypertrophy, nasal, turbinate [J34.3] Chronic pansinusitis [J32.4]    Post-Op Diagnosis: Same       Procedure(s):  REVISION BILATERAL MAXILLARY ANTROSTOMY WITH TISSUE REMOVAL, BILATERAL ETHMOIDECTOMY WITH SPHENOIDOTOMY WITH TISSUE REMOVAL, FRONTAL SINUS EXPLORATION, BILATERAL SUBMUCOUS TURBINATE RESECTION    Surgeon(s):  Scott Sanders DO    Assistant:  * No surgical staff found *    Anesthesia: General    Estimated Blood Loss (mL): less than 236     Complications: None    Specimens:   ID Type Source Tests Collected by Time Destination   A : BILATERAL SINUS CONTENTS Tissue Tissue SURGICAL PATHOLOGY Scott Sanders DO 12/28/2020 1169        Implants:  * No implants in log *      Drains: * No LDAs found *    Findings: see op note    Electronically signed by Scott Sanders DO on 12/28/2020 at 9:27 AM

## 2020-12-28 NOTE — PROGRESS NOTES
Current Allergies: Cat hair extract and Nickel    No results for input(s): POCGLU in the last 72 hours. Admitted to PACU bed 9 from OR. Arrived on a stretcher . Attached to PACU monitoring system. Alarms and parameters set. Report received from anesthesia personnel. OR staff did not report skin issues that were observed while in OR  No problems reported intraoperatively. Pt arrived with oxygen per non-rebreather face mask with oxygen at 8 liters. Athrombic wraps in place.        Cedric Spiritwood

## 2021-01-05 ENCOUNTER — OFFICE VISIT (OUTPATIENT)
Dept: ENT CLINIC | Age: 46
End: 2021-01-05
Payer: COMMERCIAL

## 2021-01-05 VITALS — BODY MASS INDEX: 28.56 KG/M2 | WEIGHT: 182 LBS | HEIGHT: 67 IN | TEMPERATURE: 97.3 F

## 2021-01-05 DIAGNOSIS — J34.3 NASAL TURBINATE HYPERTROPHY: ICD-10-CM

## 2021-01-05 DIAGNOSIS — J32.4 CHRONIC PANSINUSITIS: Primary | ICD-10-CM

## 2021-01-05 DIAGNOSIS — J32.9 SINUSITIS WITH NASAL POLYPS: ICD-10-CM

## 2021-01-05 DIAGNOSIS — J33.9 SINUSITIS WITH NASAL POLYPS: ICD-10-CM

## 2021-01-05 DIAGNOSIS — R43.0 ANOSMIA: ICD-10-CM

## 2021-01-05 PROCEDURE — 31237 NSL/SINS NDSC SURG BX POLYPC: CPT | Performed by: OTOLARYNGOLOGY

## 2021-01-05 PROCEDURE — 99213 OFFICE O/P EST LOW 20 MIN: CPT | Performed by: OTOLARYNGOLOGY

## 2021-01-05 ASSESSMENT — ENCOUNTER SYMPTOMS
COLOR CHANGE: 0
DIARRHEA: 0
TROUBLE SWALLOWING: 0
FACIAL SWELLING: 0
PHOTOPHOBIA: 0
NAUSEA: 0
STRIDOR: 0
SORE THROAT: 0
EYE ITCHING: 0
SINUS PAIN: 0
SINUS PRESSURE: 0
COUGH: 0
SHORTNESS OF BREATH: 0
RHINORRHEA: 0
VOICE CHANGE: 0
EYE REDNESS: 0
EYE PAIN: 0
CHOKING: 0

## 2021-01-05 NOTE — PROGRESS NOTES
[facility]  [facility] E. Kelly Fuller, [address] [place] [address]  P: [phone]  F: [phone]       Patient     Marvin Phalen  1975    ChiefComplaint     Chief Complaint   Patient presents with    Post-Op Check     Patient is here today for her post -op, she is doing well, still has little engery, not sleeping very well and headaches       History of Present Illness     Germaine Sage is a pleasant 39 y.o. female here for 1 week postop visit status post endoscopic sinus surgery and turbinate reduction. Overall patient doing well. She does have some head pressure and headache symptoms. She has been using nasal spray as instructed. No significant bleeding. Mild to moderate pain controlled with medication.     Past Medical History     Past Medical History:   Diagnosis Date    Allergic rhinitis     Asthma     being evaluated 4/2016    Meniere disease        Past Surgical History     Past Surgical History:   Procedure Laterality Date    DILATION AND CURETTAGE OF UTERUS      ENDOSCOPY, COLON, DIAGNOSTIC  4-    bronchscopy w/ BAL    SINUS ENDOSCOPY Bilateral 12/28/2020    REVISION BILATERAL MAXILLARY ANTROSTOMY WITH TISSUE REMOVAL, BILATERAL ETHMOIDECTOMY WITH SPHENOIDOTOMY WITH TISSUE REMOVAL, FRONTAL SINUS EXPLORATION, BILATERAL SUBMUCOUS TURBINATE RESECTION performed by Patsi Mortimer, DO at [address] [place] [address] Bilateral 04/26/2017    BILATERAL FRONTAL SINUSOTOMIES WITH REMOVAL OF CONTENTS, BILATERAL ETHMOIDECTOMIES WITH REMOVAL OF CONTENTS, BILATERAL MAXILLARY ANTHROSTOMIES WITH REMOVAL OF CONTENTS       Family History     Family History   Problem Relation Age of Onset    Heart Disease Father        Social History     Social History     Socioeconomic History    Marital status:      Spouse name: Not on file    Number of children: Not on file    Years of education: Not on file    Highest education level: Not on file   Occupational History  Not on file   Social Needs    Financial resource strain: Not on file    Food insecurity     Worry: Not on file     Inability: Not on file    Transportation needs     Medical: Not on file     Non-medical: Not on file   Tobacco Use    Smoking status: Never Smoker    Smokeless tobacco: Never Used   Substance and Sexual Activity    Alcohol use:  Yes     Alcohol/week: 2.0 standard drinks     Types: 2 Cans of beer per week     Comment: 2 beers a week OR <    Drug use: No    Sexual activity: Not on file   Lifestyle    Physical activity     Days per week: Not on file     Minutes per session: Not on file    Stress: Not on file   Relationships    Social connections     Talks on phone: Not on file     Gets together: Not on file     Attends Confucianist service: Not on file     Active member of club or organization: Not on file     Attends meetings of clubs or organizations: Not on file     Relationship status: Not on file    Intimate partner violence     Fear of current or ex partner: Not on file     Emotionally abused: Not on file     Physically abused: Not on file     Forced sexual activity: Not on file   Other Topics Concern    Not on file   Social History Narrative    Not on file       Allergies     Allergies   Allergen Reactions    Cat Hair Extract      CATS- ITCHY, WATERY EYES, SNEEZING    Nickel Rash     Skin sensitive to inexpensive earrings       Medications     Current Outpatient Medications   Medication Sig Dispense Refill    Multiple Vitamin (MULTIVITAMIN PO) Take 1 tablet by mouth daily      albuterol (PROVENTIL) (2.5 MG/3ML) 0.083% nebulizer solution Take 2.5 mg by nebulization every 4 hours as needed       NUCALA 100 MG SOLR injection Inject 300 mg into the skin every 30 days       predniSONE (DELTASONE) 10 MG tablet Take 2 tablets by mouth daily 60 tablet 5    PROAIR  (90 BASE) MCG/ACT inhaler INHALE TWO PUFFS BY MOUTH FOUR TIMES A DAY 1 Inhaler 11  Respiratory Therapy Supplies (NEBULIZER/TUBING/MOUTHPIECE) KIT 1 kit by Does not apply route daily as needed Dx: Asthma Severe       ICD-10: J45.909 1 kit 0    Misc. Devices (Rich Dwight) MISC To use after neb treatment 1 each 0     No current facility-administered medications for this visit. Review of Systems     Review of Systems   Constitutional: Negative for chills, fatigue and fever. HENT: Negative for congestion, ear discharge, ear pain, facial swelling, hearing loss, nosebleeds, postnasal drip, rhinorrhea, sinus pressure, sinus pain, sneezing, sore throat, tinnitus, trouble swallowing and voice change. Eyes: Negative for photophobia, pain, redness, itching and visual disturbance. Respiratory: Negative for cough, choking, shortness of breath and stridor. Gastrointestinal: Negative for diarrhea and nausea. Musculoskeletal: Negative for neck pain and neck stiffness. Skin: Negative for color change and rash. Neurological: Negative for dizziness, facial asymmetry and light-headedness. Hematological: Negative for adenopathy. Psychiatric/Behavioral: Negative for agitation and confusion. PhysicalExam     Vitals:    01/05/21 1405   Temp: 97.3 °F (36.3 °C)       Physical Exam  Constitutional:       Appearance: She is well-developed. HENT:      Head: Normocephalic and atraumatic. Jaw: No trismus. Right Ear: Tympanic membrane, ear canal and external ear normal. No drainage. No middle ear effusion. Tympanic membrane is not perforated. Left Ear: Tympanic membrane, ear canal and external ear normal. No drainage. No middle ear effusion. Tympanic membrane is not perforated. Nose: No septal deviation, mucosal edema or rhinorrhea. Mouth/Throat:      Dentition: Normal dentition. Pharynx: Uvula midline. No oropharyngeal exudate. Eyes:      General: No scleral icterus. Right eye: No discharge. Left eye: No discharge. Pupils: Pupils are equal, round, and reactive to light. Neck:      Musculoskeletal: Neck supple. Thyroid: No thyromegaly. Trachea: Phonation normal. No tracheal deviation. Pulmonary:      Effort: Pulmonary effort is normal. No respiratory distress. Breath sounds: No stridor. Lymphadenopathy:      Cervical: No cervical adenopathy. Skin:     General: Skin is warm and dry. Neurological:      Mental Status: She is alert and oriented to person, place, and time. Cranial Nerves: No cranial nerve deficit. Psychiatric:         Behavior: Behavior normal.           Procedure       Nasal Endoscopy with Debridement  Pre Op Dx: Nasal congestion, post operative sinus surgery  Post Op Dx: Same  Procedure: Nasal endoscopy with debridement   Anesthesia: 2% lidocaine with afrin tiopical  EBL: None  Specimens: None  Findings: Bilateral nasal cavity and sinus crusting and mucus. Procedure:    After the application of afrin and pontocaine the nasal sinus cavity was debrided utilizing a zero degree justino endoscope with Kerrison rongeurs and hickman suctions on both sides. The sinus lining was healing well. No evidence of bleeding or rhinorrhea was noted. Tolerated well. Complications: None        Assessment and Plan     1. Chronic pansinusitis  1 week status post endoscopic sinus surgery and turbinate reduction. Nasal obstruction significantly improved. Debridement performed in the office today. Sinuses healing well bilaterally. Continue nasal saline rinse twice a day for the next 2 weeks. Follow-up in 2 weeks. Return to normal activity this weekend. 2. Sinusitis with nasal polyps      3. Anosmia      4. Nasal turbinate hypertrophy        Return in about 2 weeks (around 1/19/2021). Portions of this note were dictated using Dragon.  There may be linguistic errors secondary to the use of this program.

## 2021-01-19 ENCOUNTER — OFFICE VISIT (OUTPATIENT)
Dept: ENT CLINIC | Age: 46
End: 2021-01-19
Payer: COMMERCIAL

## 2021-01-19 VITALS — BODY MASS INDEX: 29.35 KG/M2 | TEMPERATURE: 97.7 F | WEIGHT: 187 LBS | HEIGHT: 67 IN

## 2021-01-19 DIAGNOSIS — J34.3 NASAL TURBINATE HYPERTROPHY: ICD-10-CM

## 2021-01-19 DIAGNOSIS — J32.9 SINUSITIS WITH NASAL POLYPS: Primary | ICD-10-CM

## 2021-01-19 DIAGNOSIS — J33.9 SINUSITIS WITH NASAL POLYPS: Primary | ICD-10-CM

## 2021-01-19 DIAGNOSIS — J32.4 CHRONIC PANSINUSITIS: ICD-10-CM

## 2021-01-19 PROCEDURE — 31237 NSL/SINS NDSC SURG BX POLYPC: CPT | Performed by: OTOLARYNGOLOGY

## 2021-01-19 PROCEDURE — 99213 OFFICE O/P EST LOW 20 MIN: CPT | Performed by: OTOLARYNGOLOGY

## 2021-01-19 RX ORDER — FLUTICASONE PROPIONATE 50 MCG
2 SPRAY, SUSPENSION (ML) NASAL 2 TIMES DAILY
Qty: 2 BOTTLE | Refills: 5 | Status: SHIPPED | OUTPATIENT
Start: 2021-01-19 | End: 2021-02-18

## 2021-01-19 ASSESSMENT — ENCOUNTER SYMPTOMS
SORE THROAT: 0
SINUS PRESSURE: 0
COLOR CHANGE: 0
EYE ITCHING: 0
TROUBLE SWALLOWING: 0
COUGH: 0
EYE REDNESS: 0
STRIDOR: 0
RHINORRHEA: 0
EYE PAIN: 0
CHOKING: 0
FACIAL SWELLING: 0
DIARRHEA: 0
PHOTOPHOBIA: 0
VOICE CHANGE: 0
SINUS PAIN: 0
NAUSEA: 0
SHORTNESS OF BREATH: 0

## 2021-01-19 NOTE — PROGRESS NOTES
[facility]  [facility] RUDOLPH Russell, [address]  P: [phone]  F: [phone]       Patient     Gonzalo Jean  1975    ChiefComplaint     Chief Complaint   Patient presents with    Post-Op Check     Patient is here for her post op follow up she is doing well with no complaints or concerns       History of Present Illness     Germaine Sage is a pleasant 39 y.o. female here for 3-week postop visit for endoscopic sinus surgery and turbinate reduction. Overall doing well. Has been using rinse as instructed. No significant drainage.     Past Medical History     Past Medical History:   Diagnosis Date    Allergic rhinitis     Asthma     being evaluated 4/2016    Meniere disease        Past Surgical History     Past Surgical History:   Procedure Laterality Date    DILATION AND CURETTAGE OF UTERUS      ENDOSCOPY, COLON, DIAGNOSTIC  4-    bronchscopy w/ BAL    SINUS ENDOSCOPY Bilateral 12/28/2020    REVISION BILATERAL MAXILLARY ANTROSTOMY WITH TISSUE REMOVAL, BILATERAL ETHMOIDECTOMY WITH SPHENOIDOTOMY WITH TISSUE REMOVAL, FRONTAL SINUS EXPLORATION, BILATERAL SUBMUCOUS TURBINATE RESECTION performed by Zaira Ruiz DO at [address] [place] [address] Bilateral 04/26/2017    BILATERAL FRONTAL SINUSOTOMIES WITH REMOVAL OF CONTENTS, BILATERAL ETHMOIDECTOMIES WITH REMOVAL OF CONTENTS, BILATERAL MAXILLARY ANTHROSTOMIES WITH REMOVAL OF CONTENTS       Family History     Family History   Problem Relation Age of Onset    Heart Disease Father        Social History     Social History     Socioeconomic History    Marital status:      Spouse name: Not on file    Number of children: Not on file    Years of education: Not on file    Highest education level: Not on file   Occupational History    Not on file   Social Needs    Financial resource strain: Not on file    Food insecurity     Worry: Not on file     Inability: Not on file   CSDN needs Medical: Not on file     Non-medical: Not on file   Tobacco Use    Smoking status: Never Smoker    Smokeless tobacco: Never Used   Substance and Sexual Activity    Alcohol use:  Yes     Alcohol/week: 2.0 standard drinks     Types: 2 Cans of beer per week     Comment: 2 beers a week OR <    Drug use: No    Sexual activity: Not on file   Lifestyle    Physical activity     Days per week: Not on file     Minutes per session: Not on file    Stress: Not on file   Relationships    Social connections     Talks on phone: Not on file     Gets together: Not on file     Attends Anabaptist service: Not on file     Active member of club or organization: Not on file     Attends meetings of clubs or organizations: Not on file     Relationship status: Not on file    Intimate partner violence     Fear of current or ex partner: Not on file     Emotionally abused: Not on file     Physically abused: Not on file     Forced sexual activity: Not on file   Other Topics Concern    Not on file   Social History Narrative    Not on file       Allergies     Allergies   Allergen Reactions    Cat Hair Extract      CATS- ITCHY, WATERY EYES, SNEEZING    Nickel Rash     Skin sensitive to inexpensive earrings       Medications     Current Outpatient Medications   Medication Sig Dispense Refill    fluticasone (FLONASE) 50 MCG/ACT nasal spray 2 sprays by Nasal route 2 times daily 2 Bottle 5    Multiple Vitamin (MULTIVITAMIN PO) Take 1 tablet by mouth daily      albuterol (PROVENTIL) (2.5 MG/3ML) 0.083% nebulizer solution Take 2.5 mg by nebulization every 4 hours as needed       NUCALA 100 MG SOLR injection Inject 300 mg into the skin every 30 days       predniSONE (DELTASONE) 10 MG tablet Take 2 tablets by mouth daily 60 tablet 5    PROAIR  (90 BASE) MCG/ACT inhaler INHALE TWO PUFFS BY MOUTH FOUR TIMES A DAY 1 Inhaler 11  Respiratory Therapy Supplies (NEBULIZER/TUBING/MOUTHPIECE) KIT 1 kit by Does not apply route daily as needed Dx: Asthma Severe       ICD-10: J45.909 1 kit 0    Misc. Devices (Dillon Girt) MISC To use after neb treatment 1 each 0     No current facility-administered medications for this visit. Review of Systems     Review of Systems   Constitutional: Negative for chills, fatigue and fever. HENT: Negative for congestion, ear discharge, ear pain, facial swelling, hearing loss, nosebleeds, postnasal drip, rhinorrhea, sinus pressure, sinus pain, sneezing, sore throat, tinnitus, trouble swallowing and voice change. Eyes: Negative for photophobia, pain, redness, itching and visual disturbance. Respiratory: Negative for cough, choking, shortness of breath and stridor. Gastrointestinal: Negative for diarrhea and nausea. Musculoskeletal: Negative for neck pain and neck stiffness. Skin: Negative for color change and rash. Neurological: Negative for dizziness, facial asymmetry and light-headedness. Hematological: Negative for adenopathy. Psychiatric/Behavioral: Negative for agitation and confusion. PhysicalExam     Vitals:    01/19/21 1432   Temp: 97.7 °F (36.5 °C)       Physical Exam  Constitutional:       Appearance: She is well-developed. HENT:      Head: Normocephalic and atraumatic. Jaw: No trismus. Right Ear: Tympanic membrane, ear canal and external ear normal. No drainage. No middle ear effusion. Tympanic membrane is not perforated. Left Ear: Tympanic membrane, ear canal and external ear normal. No drainage. No middle ear effusion. Tympanic membrane is not perforated. Nose: No septal deviation, mucosal edema or rhinorrhea. Mouth/Throat:      Dentition: Normal dentition. Pharynx: Uvula midline. No oropharyngeal exudate. Eyes:      General: No scleral icterus. Right eye: No discharge. Left eye: No discharge. Pupils: Pupils are equal, round, and reactive to light. Neck:      Musculoskeletal: Neck supple. Thyroid: No thyromegaly. Trachea: Phonation normal. No tracheal deviation. Pulmonary:      Effort: Pulmonary effort is normal. No respiratory distress. Breath sounds: No stridor. Lymphadenopathy:      Cervical: No cervical adenopathy. Skin:     General: Skin is warm and dry. Neurological:      Mental Status: She is alert and oriented to person, place, and time. Cranial Nerves: No cranial nerve deficit. Psychiatric:         Behavior: Behavior normal.           Procedure       Nasal Endoscopy with Debridement  Pre Op Dx: Nasal congestion, post operative sinus surgery  Post Op Dx: Same  Procedure: Nasal endoscopy with debridement   Anesthesia: 2% lidocaine with afrin tiopical  EBL: None  Specimens: None  Findings: Bilateral nasal cavity and sinus crusting and mucus. Procedure:    After the application of afrin and pontocaine the nasal sinus cavity was debrided utilizing a zero degree justino endoscope with Kerrison rongeurs and hickman suctions on both sides. Portions of propel splint removed bilaterally with crusting. The sinus lining was healing well. No evidence of bleeding or rhinorrhea was noted. Tolerated well. Complications: None        Assessment and Plan     1. Sinusitis with nasal polyps  Doing well 3 weeks out from endoscopic sinus surgery for nasal polyposis. Will begin nasal steroid sprays twice daily at this time. Discussed again at length with patient importance of medical therapy for nasal polyposis. We will continue nasal saline rinses. Follow-up 3 months. Will consider budesonide rinses if steroid not helping.    - fluticasone (FLONASE) 50 MCG/ACT nasal spray; 2 sprays by Nasal route 2 times daily  Dispense: 2 Bottle; Refill: 5    2.  Chronic pansinusitis - fluticasone (FLONASE) 50 MCG/ACT nasal spray; 2 sprays by Nasal route 2 times daily  Dispense: 2 Bottle; Refill: 5    3. Nasal turbinate hypertrophy        Return in about 3 months (around 4/19/2021). Portions of this note were dictated using Dragon.  There may be linguistic errors secondary to the use of this program.

## 2021-04-20 ENCOUNTER — OFFICE VISIT (OUTPATIENT)
Dept: ENT CLINIC | Age: 46
End: 2021-04-20
Payer: COMMERCIAL

## 2021-04-20 VITALS — BODY MASS INDEX: 28.25 KG/M2 | HEIGHT: 67 IN | WEIGHT: 180 LBS

## 2021-04-20 DIAGNOSIS — J34.3 NASAL TURBINATE HYPERTROPHY: ICD-10-CM

## 2021-04-20 DIAGNOSIS — J33.9 SINUSITIS WITH NASAL POLYPS: Primary | ICD-10-CM

## 2021-04-20 DIAGNOSIS — J32.9 SINUSITIS WITH NASAL POLYPS: Primary | ICD-10-CM

## 2021-04-20 DIAGNOSIS — J32.4 CHRONIC PANSINUSITIS: ICD-10-CM

## 2021-04-20 PROCEDURE — 31231 NASAL ENDOSCOPY DX: CPT | Performed by: OTOLARYNGOLOGY

## 2021-04-20 PROCEDURE — 99213 OFFICE O/P EST LOW 20 MIN: CPT | Performed by: OTOLARYNGOLOGY

## 2021-04-20 ASSESSMENT — ENCOUNTER SYMPTOMS
STRIDOR: 0
NAUSEA: 0
EYE ITCHING: 0
COUGH: 0
CHOKING: 0
EYE PAIN: 0
SORE THROAT: 0
EYE REDNESS: 0
VOICE CHANGE: 0
DIARRHEA: 0
SINUS PAIN: 0
SINUS PRESSURE: 0
FACIAL SWELLING: 0
TROUBLE SWALLOWING: 0
RHINORRHEA: 0
PHOTOPHOBIA: 0
SHORTNESS OF BREATH: 0
COLOR CHANGE: 0

## 2021-04-20 NOTE — PROGRESS NOTES
[facility]  [address], [address] [place] [address] [place]  P: [phone]  F: [phone]       Patient     Lizzie Ramires  1975    ChiefComplaint     Chief Complaint   Patient presents with    Post-Op Check     Patient is here today for her 3 month follow up / post-op, she is doing well with no issues or complaints       History of Present Illness     Lizzie Ramires is a pleasant 55 y.o. female here for 3-month follow-up for endoscopic sinus surgery and turbinate reduction for chronic pansinusitis, nasal polyposis and turbinate hypertrophy. Overall doing well. She is using fluticasone daily. She is not having any of her typical allergy reactions like she would in the springtime. Her nasal obstruction is improved. No pressure or significant drainage.     Past Medical History     Past Medical History:   Diagnosis Date    Allergic rhinitis     Asthma     being evaluated 4/2016    Meniere disease        Past Surgical History     Past Surgical History:   Procedure Laterality Date    DILATION AND CURETTAGE OF UTERUS      ENDOSCOPY, COLON, DIAGNOSTIC  4-    bronchscopy w/ BAL    SINUS ENDOSCOPY Bilateral 12/28/2020    REVISION BILATERAL MAXILLARY ANTROSTOMY WITH TISSUE REMOVAL, BILATERAL ETHMOIDECTOMY WITH SPHENOIDOTOMY WITH TISSUE REMOVAL, FRONTAL SINUS EXPLORATION, BILATERAL SUBMUCOUS TURBINATE RESECTION performed by Franko Edgar DO at [address] [place] [address] Bilateral 04/26/2017    BILATERAL FRONTAL SINUSOTOMIES WITH REMOVAL OF CONTENTS, BILATERAL ETHMOIDECTOMIES WITH REMOVAL OF CONTENTS, BILATERAL MAXILLARY ANTHROSTOMIES WITH REMOVAL OF CONTENTS       Family History     Family History   Problem Relation Age of Onset    Heart Disease Father        Social History     Social History     Socioeconomic History    Marital status:      Spouse name: Not on file    Number of children: Not on file    Years of education: Not on file    Highest education Inhaler 11    Respiratory Therapy Supplies (NEBULIZER/TUBING/MOUTHPIECE) KIT 1 kit by Does not apply route daily as needed Dx: Asthma Severe       ICD-10: J45.909 1 kit 0    Misc. Devices (ACAPELLA) MISC To use after neb treatment 1 each 0    fluticasone (FLONASE) 50 MCG/ACT nasal spray 2 sprays by Nasal route 2 times daily 2 Bottle 5     No current facility-administered medications for this visit. Review of Systems     Review of Systems   Constitutional: Negative for chills, fatigue and fever. HENT: Negative for congestion, ear discharge, ear pain, facial swelling, hearing loss, nosebleeds, postnasal drip, rhinorrhea, sinus pressure, sinus pain, sneezing, sore throat, tinnitus, trouble swallowing and voice change. Eyes: Negative for photophobia, pain, redness, itching and visual disturbance. Respiratory: Negative for cough, choking, shortness of breath and stridor. Gastrointestinal: Negative for diarrhea and nausea. Musculoskeletal: Negative for neck pain and neck stiffness. Skin: Negative for color change and rash. Neurological: Negative for dizziness, facial asymmetry and light-headedness. Hematological: Negative for adenopathy. Psychiatric/Behavioral: Negative for agitation and confusion. PhysicalExam     There were no vitals filed for this visit. Physical Exam  Constitutional:       Appearance: She is well-developed. HENT:      Head: Normocephalic and atraumatic. Jaw: No trismus. Right Ear: Tympanic membrane, ear canal and external ear normal. No drainage. No middle ear effusion. Tympanic membrane is not perforated. Left Ear: Tympanic membrane, ear canal and external ear normal. No drainage. No middle ear effusion. Tympanic membrane is not perforated. Nose: No septal deviation, mucosal edema or rhinorrhea. Mouth/Throat:      Dentition: Normal dentition. Pharynx: Uvula midline. No oropharyngeal exudate.    Eyes:      General: No scleral 2. Chronic pansinusitis      3. Nasal turbinate hypertrophy        Return in about 1 year (around 4/20/2022). Portions of this note were dictated using Dragon.  There may be linguistic errors secondary to the use of this program.

## 2022-10-11 ENCOUNTER — OFFICE VISIT (OUTPATIENT)
Dept: ENT CLINIC | Age: 47
End: 2022-10-11
Payer: COMMERCIAL

## 2022-10-11 VITALS
BODY MASS INDEX: 28.25 KG/M2 | DIASTOLIC BLOOD PRESSURE: 71 MMHG | SYSTOLIC BLOOD PRESSURE: 103 MMHG | WEIGHT: 180 LBS | HEIGHT: 67 IN | HEART RATE: 71 BPM

## 2022-10-11 DIAGNOSIS — H91.93 BILATERAL HEARING LOSS, UNSPECIFIED HEARING LOSS TYPE: ICD-10-CM

## 2022-10-11 DIAGNOSIS — H69.83 DYSFUNCTION OF BOTH EUSTACHIAN TUBES: ICD-10-CM

## 2022-10-11 DIAGNOSIS — J33.9 SINUSITIS WITH NASAL POLYPS: ICD-10-CM

## 2022-10-11 DIAGNOSIS — J32.4 CHRONIC PANSINUSITIS: ICD-10-CM

## 2022-10-11 DIAGNOSIS — J45.51 SEVERE PERSISTENT ASTHMA WITH ACUTE EXACERBATION: ICD-10-CM

## 2022-10-11 DIAGNOSIS — R43.0 ANOSMIA: Primary | ICD-10-CM

## 2022-10-11 DIAGNOSIS — J32.9 SINUSITIS WITH NASAL POLYPS: ICD-10-CM

## 2022-10-11 PROCEDURE — 99214 OFFICE O/P EST MOD 30 MIN: CPT | Performed by: OTOLARYNGOLOGY

## 2022-10-11 PROCEDURE — 31231 NASAL ENDOSCOPY DX: CPT | Performed by: OTOLARYNGOLOGY

## 2022-10-11 RX ORDER — PREDNISONE 10 MG/1
TABLET ORAL
Qty: 34 TABLET | Refills: 0 | Status: SHIPPED | OUTPATIENT
Start: 2022-10-11

## 2022-10-11 ASSESSMENT — ENCOUNTER SYMPTOMS
DIARRHEA: 0
EYE REDNESS: 0
COLOR CHANGE: 0
SORE THROAT: 0
RHINORRHEA: 0
TROUBLE SWALLOWING: 0
PHOTOPHOBIA: 0
SINUS PRESSURE: 1
VOICE CHANGE: 0
CHOKING: 0
SINUS PAIN: 0
COUGH: 0
FACIAL SWELLING: 0
EYE PAIN: 0
EYE ITCHING: 0
NAUSEA: 0
STRIDOR: 0
SHORTNESS OF BREATH: 0

## 2022-10-11 NOTE — PROGRESS NOTES
[facility]  [address] [address] [facility], [address] [place] [address]  P: [phone]  F: [phone]       Patient     Berenice Whiteside  1975    ChiefComplaint     Chief Complaint   Patient presents with    Sinus Problem     Patient is here today because she has not had a scent of smell going on 4 months (No Covid)     Ear Problem     Her ears feel clogged but they make a squeaking noise as if they want to pop        History of Present Illness     Germaine Sage is a pleasant 52 y.o. female known to me with history of endoscopic sinus surgery and turbinate reduction for pansinusitis, nasal polyposis and turbinate hypertrophy. Last seen April 2021. Has developed decreased sense of smell for the past 4 months. Her ears feel clogged like they are going to pop, but they do not. She does feel like there is some slightly decreased hearing in both ears. No tinnitus or spinning sensation. Continues to use fluticasone 2 sprays nostril twice daily. She is on Nucala for Churg-Angela. She is currently weaning herself down on prednisone and. She is on 3 mg a day. She is wondering if her decreasing the dosage of her prednisone made the symptoms worse.     Past Medical History     Past Medical History:   Diagnosis Date    Allergic rhinitis     Asthma     being evaluated 4/2016    Meniere disease        Past Surgical History     Past Surgical History:   Procedure Laterality Date    DILATION AND CURETTAGE OF UTERUS      ENDOSCOPY, COLON, DIAGNOSTIC  4-    bronchscopy w/ BAL    SINUS ENDOSCOPY Bilateral 12/28/2020    REVISION BILATERAL MAXILLARY ANTROSTOMY WITH TISSUE REMOVAL, BILATERAL ETHMOIDECTOMY WITH SPHENOIDOTOMY WITH TISSUE REMOVAL, FRONTAL SINUS EXPLORATION, BILATERAL SUBMUCOUS TURBINATE RESECTION performed by Bo Jewell DO at [place] [address] Bilateral 04/26/2017    BILATERAL FRONTAL SINUSOTOMIES WITH REMOVAL OF CONTENTS, BILATERAL ETHMOIDECTOMIES WITH REMOVAL OF CONTENTS, BILATERAL MAXILLARY ANTHROSTOMIES WITH REMOVAL OF CONTENTS       Family History     Family History   Problem Relation Age of Onset    Heart Disease Father        Social History     Social History     Socioeconomic History    Marital status:      Spouse name: Not on file    Number of children: Not on file    Years of education: Not on file    Highest education level: Not on file   Occupational History    Not on file   Tobacco Use    Smoking status: Never    Smokeless tobacco: Never   Vaping Use    Vaping Use: Never used   Substance and Sexual Activity    Alcohol use:  Yes     Alcohol/week: 2.0 standard drinks     Types: 2 Cans of beer per week     Comment: 2 beers a week OR <    Drug use: No    Sexual activity: Not on file   Other Topics Concern    Not on file   Social History Narrative    Not on file     Social Determinants of Health     Financial Resource Strain: Not on file   Food Insecurity: Not on file   Transportation Needs: Not on file   Physical Activity: Not on file   Stress: Not on file   Social Connections: Not on file   Intimate Partner Violence: Not on file   Housing Stability: Not on file       Allergies     Allergies   Allergen Reactions    Cat Hair Extract      CATS- ITCHY, WATERY EYES, SNEEZING    Nickel Rash     Skin sensitive to inexpensive earrings       Medications     Current Outpatient Medications   Medication Sig Dispense Refill    predniSONE (DELTASONE) 10 MG tablet 4 tablets daily for 7 days, 3 tablets daily for 1 day, 2 tablets daily for 1 day, 1 tablet daily for 1 day 34 tablet 0    Multiple Vitamin (MULTIVITAMIN PO) Take 1 tablet by mouth daily      albuterol (PROVENTIL) (2.5 MG/3ML) 0.083% nebulizer solution Take 2.5 mg by nebulization every 4 hours as needed       NUCALA 100 MG SOLR injection Inject 300 mg into the skin every 30 days       predniSONE (DELTASONE) 10 MG tablet Take 2 tablets by mouth daily 60 tablet 5    PROAIR  (90 BASE) MCG/ACT inhaler INHALE TWO PUFFS BY MOUTH FOUR TIMES A DAY 1 Inhaler 11    Respiratory Therapy Supplies (NEBULIZER/TUBING/MOUTHPIECE) KIT 1 kit by Does not apply route daily as needed Dx: Asthma Severe       ICD-10: J45.909 1 kit 0    Misc. Devices (ACAPELLA) MISC To use after neb treatment 1 each 0    fluticasone (FLONASE) 50 MCG/ACT nasal spray 2 sprays by Nasal route 2 times daily 2 Bottle 5     No current facility-administered medications for this visit. Review of Systems     Review of Systems   Constitutional:  Negative for chills, fatigue and fever. HENT:  Positive for congestion, hearing loss, postnasal drip and sinus pressure. Negative for ear discharge, ear pain, facial swelling, nosebleeds, rhinorrhea, sinus pain, sneezing, sore throat, tinnitus, trouble swallowing and voice change. Eyes:  Negative for photophobia, pain, redness, itching and visual disturbance. Respiratory:  Negative for cough, choking, shortness of breath and stridor. Gastrointestinal:  Negative for diarrhea and nausea. Musculoskeletal:  Negative for neck pain and neck stiffness. Skin:  Negative for color change and rash. Neurological:  Negative for dizziness, facial asymmetry and light-headedness. Hematological:  Negative for adenopathy. Psychiatric/Behavioral:  Negative for agitation and confusion. PhysicalExam     Vitals:    10/11/22 1402   BP: 103/71   Pulse: 71       Physical Exam  Constitutional:       Appearance: She is well-developed. HENT:      Head: Normocephalic and atraumatic. Jaw: No trismus. Right Ear: Tympanic membrane, ear canal and external ear normal. No drainage. No middle ear effusion. Tympanic membrane is not perforated. Left Ear: Tympanic membrane, ear canal and external ear normal. No drainage. No middle ear effusion. Tympanic membrane is not perforated. Nose: No septal deviation, mucosal edema or rhinorrhea. Mouth/Throat:      Dentition: Normal dentition.       Pharynx: Uvula midline. No oropharyngeal exudate. Eyes:      General: No scleral icterus. Right eye: No discharge. Left eye: No discharge. Pupils: Pupils are equal, round, and reactive to light. Neck:      Thyroid: No thyromegaly. Trachea: Phonation normal. No tracheal deviation. Pulmonary:      Effort: Pulmonary effort is normal. No respiratory distress. Breath sounds: No stridor. Musculoskeletal:      Cervical back: Neck supple. Lymphadenopathy:      Cervical: No cervical adenopathy. Skin:     General: Skin is warm and dry. Neurological:      Mental Status: She is alert and oriented to person, place, and time. Cranial Nerves: No cranial nerve deficit. Psychiatric:         Behavior: Behavior normal.         Procedure     Rigid Nasal Endoscopy    Preop: Nasal polyposis, anosmia  Anes: topical lidocaine with afrin  Consent: verbal  Description:  After obtaining verbal consent from the patient 4% lidocaine with afrin was sprayed into the nasal cavities. After allowing a time for anesthesia, a nasal endoscope was placed into the naris. The septum, inferior, and middle turbinates were examined. The middle meatus, and sphenoethmoid recess was examined bilaterally. Patent maxillary ethmoid cavities bilaterally. Polyps emanating from right middle meatus, right medial corridor, left middle meatus. Thickened clear mucus drainage. No mucopurulent drainage. Tolerated well without complication. Assessment and Plan     1. Anosmia      2. Dysfunction of both eustachian tubes    - Raul Doe [place]. LUCINA, Audiology, [facility]    3. Chronic pansinusitis  Patient has exacerbation of her sinusitis and nasal polyps symptoms. There are nasal polyps on exam today. She has been doing Flonase twice a day and receives Nucala injections. She has been weaning herself down on prednisone and currently takes 3 mg a day.   I recommend switching her from fluticasone to budesonide nasal rinses given her history of asthma. Additionally, we will put her on a prednisone burst for 10 days. Proper ministration risk discussed. I would also like obtain a hearing test at her 1 month follow-up. There may be some middle ear effusion present, however I am unable to clearly discern based on physical examination alone. 4. Sinusitis with nasal polyps    - predniSONE (DELTASONE) 10 MG tablet; 4 tablets daily for 7 days, 3 tablets daily for 1 day, 2 tablets daily for 1 day, 1 tablet daily for 1 day  Dispense: 34 tablet; Refill: 0    5. Severe persistent asthma with acute exacerbation      6. Bilateral hearing loss, unspecified hearing loss type    - [address] [facility],[address], [place]. SADIA., Audiology, [facility]    Return in about 4 weeks (around 11/8/2022) for audiogram first.      Portions of this note were dictated using Dragon.  There may be linguistic errors secondary to the use of this program.

## 2022-10-13 ENCOUNTER — TELEPHONE (OUTPATIENT)
Dept: ENT CLINIC | Age: 47
End: 2022-10-13

## 2022-10-13 NOTE — TELEPHONE ENCOUNTER
Pharmacy called stating that they faxed over medication change request on 10/11/22 and haven't received a response.

## 2022-10-14 ENCOUNTER — TELEPHONE (OUTPATIENT)
Dept: ADMINISTRATIVE | Age: 47
End: 2022-10-14

## 2022-10-14 NOTE — TELEPHONE ENCOUNTER
Submitted PA for Budesonide 1MG/2ML suspension  Via CMM Key: [id number] STATUS: NOT SENT. There is not a current RX on file; If you want PA please submit new RX and PA request.      If this requires a response please respond to the pool ( P [id number]). Thank you please advise patient.

## 2022-11-15 ENCOUNTER — OFFICE VISIT (OUTPATIENT)
Dept: ENT CLINIC | Age: 47
End: 2022-11-15
Payer: COMMERCIAL

## 2022-11-15 ENCOUNTER — PROCEDURE VISIT (OUTPATIENT)
Dept: AUDIOLOGY | Age: 47
End: 2022-11-15
Payer: COMMERCIAL

## 2022-11-15 VITALS
WEIGHT: 180 LBS | HEART RATE: 76 BPM | SYSTOLIC BLOOD PRESSURE: 99 MMHG | BODY MASS INDEX: 28.25 KG/M2 | HEIGHT: 67 IN | DIASTOLIC BLOOD PRESSURE: 66 MMHG

## 2022-11-15 DIAGNOSIS — H93.8X1 EAR PRESSURE, RIGHT: ICD-10-CM

## 2022-11-15 DIAGNOSIS — J33.9 SINUSITIS WITH NASAL POLYPS: ICD-10-CM

## 2022-11-15 DIAGNOSIS — D72.18 CHURG-STRAUSS SYNDROME (HCC): ICD-10-CM

## 2022-11-15 DIAGNOSIS — J32.4 CHRONIC PANSINUSITIS: ICD-10-CM

## 2022-11-15 DIAGNOSIS — J32.9 SINUSITIS WITH NASAL POLYPS: ICD-10-CM

## 2022-11-15 DIAGNOSIS — H91.8X9 ASYMMETRICAL HEARING LOSS: Primary | ICD-10-CM

## 2022-11-15 DIAGNOSIS — J45.909 SEVERE ASTHMA, UNSPECIFIED WHETHER COMPLICATED, UNSPECIFIED WHETHER PERSISTENT: ICD-10-CM

## 2022-11-15 DIAGNOSIS — H90.41 SENSORINEURAL HEARING LOSS (SNHL) OF RIGHT EAR WITH UNRESTRICTED HEARING OF LEFT EAR: ICD-10-CM

## 2022-11-15 DIAGNOSIS — H90.41 SENSORINEURAL HEARING LOSS (SNHL) OF RIGHT EAR WITH UNRESTRICTED HEARING OF LEFT EAR: Primary | ICD-10-CM

## 2022-11-15 DIAGNOSIS — M30.1 CHURG-STRAUSS SYNDROME (HCC): ICD-10-CM

## 2022-11-15 PROCEDURE — 92567 TYMPANOMETRY: CPT | Performed by: AUDIOLOGIST

## 2022-11-15 PROCEDURE — 92557 COMPREHENSIVE HEARING TEST: CPT | Performed by: AUDIOLOGIST

## 2022-11-15 PROCEDURE — 99214 OFFICE O/P EST MOD 30 MIN: CPT | Performed by: OTOLARYNGOLOGY

## 2022-11-15 ASSESSMENT — ENCOUNTER SYMPTOMS
EYE ITCHING: 0
NAUSEA: 0
FACIAL SWELLING: 0
TROUBLE SWALLOWING: 0
COUGH: 0
DIARRHEA: 0
EYE REDNESS: 0
SINUS PAIN: 0
SHORTNESS OF BREATH: 0
CHOKING: 0
EYE PAIN: 0
VOICE CHANGE: 0
STRIDOR: 0
RHINORRHEA: 0
SINUS PRESSURE: 0
COLOR CHANGE: 0
PHOTOPHOBIA: 0
SORE THROAT: 0

## 2022-11-15 NOTE — PROGRESS NOTES
Kristin Ronen Sage   1975, 52 y.o. female   [id number]       Referring Provider: Pedro Zavala DO  Referral Type: In an order in [facility]    Reason for Visit: Evaluation of suspected change in hearing, tinnitus, or balance. ADULT AUDIOLOGIC EVALUATION      Germaine Sage is a 52 y.o. female seen today, 11/15/2022, for an initial audiologic evaluation. AUDIOLOGIC AND OTHER PERTINENT MEDICAL HISTORY:        Germaine Sage noted ear fullness in right ear, present for a few months; some decrease in hearing RE>LE; history of noise exposure, worked around [employer] at times for about 14 years. Germaine Sage denied  otalgia, otorrhea, tinnitus, dizziness, imbalance, history of head trauma, history of ear surgery, and family history of  early onset hearing loss. IMPRESSIONS:       Today's results are consistent with right ear sensorineural hearing loss with left ear within normal limits; both ears with borderline-negative middle ear pressure and excellent word recognition. Hearing loss is significant enough to result in difficulty understanding speech in at least some listening environments. Discussed good communication strategies and considerations for amplification if desired. Follow medical recommendations from Dr. Joey Mcrae. ASSESSMENT AND FINDINGS:       Otoscopy revealed: Clear ear canals bilaterally      RIGHT EAR:  Hearing Sensitivity: Borderline-normal to mild through 3000 Hz precipitously sloping to severe sensorineural hearing loss. Speech Recognition Threshold: 35 dBHL, masked  Word Recognition: Excellent (96%), based on NU-6 25-word list at 60 dBHL, masked, using recorded speech stimuli. Tympanometry: Negative peak pressure with normal compliance, Type C tympanogram, consistent with ETD/history of otitis media. LEFT EAR:  Hearing Sensitivity: Within normal limits.   Speech Recognition Threshold: 10 dBHL  Word Recognition: Excellent (96%), based on NU-6 25-word list at 45 dBHL using recorded speech stimuli. Tympanometry: Borderline-negative peak pressure and compliance, Type A tympanogram, consistent with normal middle ear function. NOTE: An asymmetry of 15-75 dBHL is present 500-8000 Hz with right ear worse than left ear. Reliability: Good  Transducer: Inserts    See scanned audiogram dated 11/15/2022 for results. PATIENT EDUCATION:       The following items were discussed with the patient:   - Good Communication Strategies  - Hearing Loss and Hearing Aids  - Noise-Induced Hearing Loss and use of Hearing Protection Devices (HPDs)     Educational information was shared in the After Visit Summary. RECOMMENDATIONS:                                                                                                                                                                                                                                                                      The following items are recommended based on patient report and results from today's appointment:  - Continue medical follow-up with Colletta Prime, DO.  - Retest hearing as medically indicated and/or sooner if a change in hearing is noted. - If desired, schedule a Hearing Aid Evaluation (HAE) appointment to discuss hearing aid options. - Utilize \"Good Communication Strategies\" as discussed to assist in speech understanding with communication partners. - Use hearing protection devices (HPDs), such as protective ear muffs and ear plugs, when exposed to dangerous sound levels. [facility] [place], [place]  Audiologist       Chart CC'd to:  Colletta Prime, DO      Degree of   Hearing Sensitivity dB Range   Within Normal Limits (WNL) 0 - 20   Mild 20 - 40   Moderate 40 - 55   Moderately-Severe 55 - 70   Severe 70 - 90   Profound 90 +

## 2022-11-15 NOTE — PATIENT INSTRUCTIONS
into a persons ear      Some additional items that may be helpful:   - Remain patient - this is important for both parties   - Write down items that still cannot be heard/understood. You may write with pen/paper or consider typing/texting on a cell phone or smart device. - If background noise is unavoidable, try to keep yourself in a good position in the room. By sitting at a hollis on the side of the restaurant (preferably a corner), it will be easier to communicate than if you were sitting at a table in the middle with background noise surrounding you. Keep yourself positioned away from music speakers or heavy foot traffic.   - If you have difficulty with the television, consider these options:      -- Use closed-captioning, which is a setting you can turn on that displays the spoken words in a written form on the screen. There may be a slight delay, but this can help fill in missing information. This can be especially helpful when watching programs with accented speech. -- Consider use of a sound bar or speakers that come from the front of the TV. With modern flat screen TVs, many of them have speakers that come out of the back of the device, which makes sound bounce off the wall behind it, then go into the room. Sound bars can allow the sound to go straight in your direction and can improve sound quality. -- Consider ear level devices to help improve the volume and/or sound quality of the program.  There are devices that work like headphones that you can adjust the volume for your ears while others can have the volume at a more comfortable level, such as \"TV Ears\". Most hearing aids have devices that allow them to connect directly to the TV and improve sound quality. Hearing Loss: Care Instructions  Your Care Instructions      Hearing loss is a sudden or slow decrease in how well you hear. It can range from mild to profound.  Permanent hearing loss can occur with aging, and it can happen when you are exposed long-term to loud noise. Examples include listening to loud music, riding motorcycles, or being around other loud machines. Hearing loss can affect your work and home life. It can make you feel lonely or depressed. You may feel that you have lost your independence. But hearing aids and other devices can help you hear better and feel connected to others. Follow-up care is a key part of your treatment and safety. Be sure to make and go to all appointments, and call your doctor if you are having problems. It's also a good idea to know your test results and keep a list of the medicines you take. How can you care for yourself at home? Avoid loud noises whenever possible. This helps keep your hearing from getting worse. Always wear hearing protection around loud noises. If appropriate, wear hearing aid(s) as directed. It is recommended that hearing aids are worn during all waking hours to keep your brain active and give it access to the sounds it is missing. If you are beginning your process with hearing aid(s), schedule a \"Hearing Aid Evaluation\" with an audiologist to discuss your lifestyle, features of hearing aid technology, and styles of hearing aids available. It is recommended that you contact your insurance company to determine if you have a hearing aid benefit, as this may dictate who you can see for these services. Have hearing tests as your doctor suggests. They can show whether your hearing has changed. Your hearing aid may need to be adjusted. Use other assistive devices as needed. These may include:  Telephone amplifiers and hearing aids that can connect to a television, stereo, radio, or microphone. Devices that use lights or vibrations. These alert you to the doorbell, a ringing telephone, or a baby monitor. Television closed-captioning. This shows the words at the bottom of the screen. Most new TVs can do this. TTY (text telephone).  This lets you type messages back and forth on the telephone instead of talking or listening. These devices are also called TDD. When messages are typed on the keyboard, they are sent over the phone line to a receiving TTY. The message is shown on a monitor. Use pagers, fax machines, text, and email if it is hard for you to communicate by telephone. Try to learn a listening technique called speech-reading. It is not lip-reading. You pay attention to people's gestures, expressions, posture, and tone of voice. These clues can help you understand what a person is saying. Face the person you are talking to, and have him or her face you. Make sure the lighting is good. You need to see the other person's face clearly. Think about counseling if you need help to adjust to your hearing loss. When should you call for help? Watch closely for changes in your health, and be sure to contact your doctor if:    You think your hearing is getting worse. You have new symptoms, such as dizziness or nausea. Noise-Induced Hearing Loss  What it is, and what you can do to prevent it    Exposure to loud sounds, in an occupational setting or recreational, can cause permanent hearing loss. Sound is measured in decibels (dB). Noise-induced hearing loss is the ONLY type of preventable hearing loss. Hearing loss related to noise exposure can occur at any age. There are small sensory cells, called inner and outer hair cells, within the inner ear (cochlea). These cells process the loudness (intensity) and pitch (frequency) of sound and send the signal to the brain via our auditory nerve (vestibulocochlear nerve, cranial nerve VIII). When these cells are damaged, they can result in permanent hearing loss and/or tinnitus. The hair cells responsible for high frequency sounds, like birds chirping, are most likely to be damaged due to loud sounds.   The high frequency sounds are also very important for our clarity and understanding of speech. OCCUPATIONAL NOISE EXPOSURE RECREATIONAL NOISE EXPOSURE   Some jobs may have exposure to loud sounds in the workplace. These jobs may include but are not limited to:  [address] settings  Manufacturing  Construction  Welding  Landscaping  Hairdressing/hairstyling  [address]   . .. And more! Many activities outside of work may cause permanent hearing loss. These activities may include but are not limited to:  Lawnmowers, leaf blowers  Farming equipment and animals (such as pigs squealing)  Chainsaws and other power tools  Playing musical instruments and/or singing  Listening to music too loudly - at concerts, through stereo, through ear buds or headphones  Attending sporting events  Attending fireworks shows or using fireworks at home  Use of firearms  . .. And more! REDUCE OR PROTECT YOUR EARS FROM NOISE EXPOSURE    To do your best to avoid noise-induced hearing loss, here are some tips:  Limit exposure to loud sounds. 85 dB (decibels) is safe for 8 hours. As sounds are louder, the length of time the sound is safe lessens. These numbers are cumulative across a 24-hour period. (NIOSH and CDC, 2002)  85 dB is safe for 8 hours  88 dB is safe for 4 hours  91 dB is safe for 2 hours  94 dB is safe for 1 hour  97 dB is safe for 30 minutes  100 dB is safe for 15 minutes  103 dB is safe for 7.5 minutes  106 dB is safe for 3.75 minutes  109 dB is safe for LESS THAN 2 minutes  112 dB is safe for LESS THAN 1 minute  115 dB is safe for ~ 30 seconds  130 dB can cause IMMEDIATE hearing loss  If you are unsure if a sound is too loud, consider checking the sound level with a \"sound level meter\". There are apps on smart devices, such as \"Decibel X\", that can measure the loudness of the sound. They are not as accurate as expensive equipment used by scientists, but it will give you a guesstimate of how loud the sound is, and if it may be damaging to your hearing.   If you cannot avoid loud sounds, here are ways to reduce your exposure:  1. Wear hearing protection  Ear plugs and protective ear muffs can be used to reduce the intensity of the sound. The higher the NRR (noise reduction rating), the better reduction of the intensity of the sound   2. Turn the volume down  When listening to music, turn the volume down, especially when wearing ear buds or headphones. A good rule of thumb is to not go beyond the middle setting on your device. If you can't hear someone talking to you from arm's length away, your music may be at a level that it can cause damage. If someone else can hear your music from 3 feet away, it may also be at a level that it can cause damage. 3. Walk away from the sound  If you do not have the ability to wear hearing protection or turn down the volume of the sound, you should do your best to move away from the source of the sound. Sound decreases in intensity as we move further from the source. The sound will decrease by 6 dB for every doubling of distance from the sound source. TYPES OF HEARING PROTECTION    The most common types of hearing protection are protective ear muffs and ear plugs. Protective ear muffs are commonly found at home improvement or sporting good stores, they can be worn time and time again and are great if you need to take your hearing protection off frequently. Ear plugs are often made of foam or soft silicone. The foam ones are designed for one-time use, while silicone ear plugs may be used multiple times. There are also \"filtered\" ear plugs that help provide even attenuation of the sound across all frequencies. These are great for listening to music or going to concerts, and allow for better understanding of speech in louder environments.   They can be purchased at music stores or online retailers (search \"Ety Plugs\" or \"filtered ear plugs\"), or custom earmolds can be made with an audiologist.    There are \"custom\" hearing protection devices that you can further discuss with your audiologist based on your specific needs, if desired. Exposure to these sounds may cause permanent damage to your hearing.   If you suspect your hearing has changed, it is recommended that you have your hearing tested by your audiologist.

## 2022-11-15 NOTE — Clinical Note
Dr. Franklin Vela,  Please see note from this patient's audiogram.  Please let me know if there is anything further you need.    Thai Epperson [address] [place] Audiologist

## 2022-11-15 NOTE — PROGRESS NOTES
[facility]  [address] [address] [facility], [address] [facility], [address] [place] [address]  P: [phone]  F: [phone]       Patient     Dotty Hunter  1975    ChiefComplaint     Chief Complaint   Patient presents with    Follow-up     Patient is here today for her follow up on her audio       History of Present Illness     Dotty Hunter is a pleasant 52 y.o. female here for follow-up for chronic sinusitis, nasal polyposis, hearing loss. Audiogram reveals left-sided normal hearing and right-sided mild to profound downward sloping sensorineural loss. Slightly negative tympanograms bilaterally. 96% word recognition scores. Completed prednisone without difficulty. Sense of smell and breathing have improved since prednisone. Was unable to get nasal steroid rinse due to some authorization issues. She saw her allergist for Churg-Angela recently is going to be started on [employer] in addition to [employer]. Continues to experience muffled hearing of the right ear as well as some pressure in the ear.     Past Medical History     Past Medical History:   Diagnosis Date    Allergic rhinitis     Asthma     being evaluated 4/2016    Meniere disease        Past Surgical History     Past Surgical History:   Procedure Laterality Date    DILATION AND CURETTAGE OF UTERUS      ENDOSCOPY, COLON, DIAGNOSTIC  4-    bronchscopy w/ BAL    SINUS ENDOSCOPY Bilateral 12/28/2020    REVISION BILATERAL MAXILLARY ANTROSTOMY WITH TISSUE REMOVAL, BILATERAL ETHMOIDECTOMY WITH SPHENOIDOTOMY WITH TISSUE REMOVAL, FRONTAL SINUS EXPLORATION, BILATERAL SUBMUCOUS TURBINATE RESECTION performed by Brigid Schwarz DO at [facility] [address] Bilateral 04/26/2017    BILATERAL FRONTAL SINUSOTOMIES WITH REMOVAL OF CONTENTS, BILATERAL ETHMOIDECTOMIES WITH REMOVAL OF CONTENTS, BILATERAL MAXILLARY ANTHROSTOMIES WITH REMOVAL OF CONTENTS       Family History     Family History   Problem Relation Age of Onset    Heart Disease Father Social History     Social History     Socioeconomic History    Marital status:      Spouse name: Not on file    Number of children: Not on file    Years of education: Not on file    Highest education level: Not on file   Occupational History    Not on file   Tobacco Use    Smoking status: Never    Smokeless tobacco: Never   Vaping Use    Vaping Use: Never used   Substance and Sexual Activity    Alcohol use:  Yes     Alcohol/week: 2.0 standard drinks     Types: 2 Cans of beer per week     Comment: 2 beers a week OR <    Drug use: No    Sexual activity: Not on file   Other Topics Concern    Not on file   Social History Narrative    Not on file     Social Determinants of Health     Financial Resource Strain: Not on file   Food Insecurity: Not on file   Transportation Needs: Not on file   Physical Activity: Not on file   Stress: Not on file   Social Connections: Not on file   Intimate Partner Violence: Not on file   Housing Stability: Not on file       Allergies     Allergies   Allergen Reactions    Cat Hair Extract      CATS- ITCHY, WATERY EYES, SNEEZING    Nickel Rash     Skin sensitive to inexpensive earrings       Medications     Current Outpatient Medications   Medication Sig Dispense Refill    predniSONE (DELTASONE) 10 MG tablet 4 tablets daily for 7 days, 3 tablets daily for 1 day, 2 tablets daily for 1 day, 1 tablet daily for 1 day 34 tablet 0    Multiple Vitamin (MULTIVITAMIN PO) Take 1 tablet by mouth daily      albuterol (PROVENTIL) (2.5 MG/3ML) 0.083% nebulizer solution Take 2.5 mg by nebulization every 4 hours as needed       NUCALA 100 MG SOLR injection Inject 300 mg into the skin every 30 days       predniSONE (DELTASONE) 10 MG tablet Take 2 tablets by mouth daily 60 tablet 5    PROAIR  (90 BASE) MCG/ACT inhaler INHALE TWO PUFFS BY MOUTH FOUR TIMES A DAY 1 Inhaler 11    Respiratory Therapy Supplies (NEBULIZER/TUBING/MOUTHPIECE) KIT 1 kit by Does not apply route daily as needed Dx: Asthma Severe       ICD-10: J45.909 1 kit 0    Misc. Devices (ACAPELLA) MISC To use after neb treatment 1 each 0    fluticasone (FLONASE) 50 MCG/ACT nasal spray 2 sprays by Nasal route 2 times daily 2 Bottle 5     No current facility-administered medications for this visit. Review of Systems     Review of Systems   Constitutional:  Negative for chills, fatigue and fever. HENT:  Positive for hearing loss. Negative for congestion, ear discharge, ear pain, facial swelling, nosebleeds, postnasal drip, rhinorrhea, sinus pressure, sinus pain, sneezing, sore throat, tinnitus, trouble swallowing and voice change. Eyes:  Negative for photophobia, pain, redness, itching and visual disturbance. Respiratory:  Negative for cough, choking, shortness of breath and stridor. Gastrointestinal:  Negative for diarrhea and nausea. Musculoskeletal:  Negative for neck pain and neck stiffness. Skin:  Negative for color change and rash. Neurological:  Negative for dizziness, facial asymmetry and light-headedness. Hematological:  Negative for adenopathy. Psychiatric/Behavioral:  Negative for agitation and confusion. PhysicalExam     Vitals:    11/15/22 1125   BP: 99/66   Pulse: 76       Physical Exam  Constitutional:       Appearance: She is well-developed. HENT:      Head: Normocephalic and atraumatic. Jaw: No trismus. Right Ear: Tympanic membrane, ear canal and external ear normal. No drainage. No middle ear effusion. Tympanic membrane is not perforated. Left Ear: Tympanic membrane, ear canal and external ear normal. No drainage. No middle ear effusion. Tympanic membrane is not perforated. Nose: No septal deviation, mucosal edema or rhinorrhea. Mouth/Throat:      Dentition: Normal dentition. Pharynx: Uvula midline. No oropharyngeal exudate. Eyes:      General: No scleral icterus. Right eye: No discharge. Left eye: No discharge.       Pupils: Pupils are equal, round, and reactive to light. Neck:      Thyroid: No thyromegaly. Trachea: Phonation normal. No tracheal deviation. Pulmonary:      Effort: Pulmonary effort is normal. No respiratory distress. Breath sounds: No stridor. Musculoskeletal:      Cervical back: Neck supple. Lymphadenopathy:      Cervical: No cervical adenopathy. Skin:     General: Skin is warm and dry. Neurological:      Mental Status: She is alert and oriented to person, place, and time. Cranial Nerves: No cranial nerve deficit. Psychiatric:         Behavior: Behavior normal.         Procedure           Assessment and Plan     1. Asymmetrical hearing loss  Audiogram reveals asymmetric right-sided mild to profound downward sloping sensorineural loss with good word recognition scores. Left-sided normal hearing. Slightly retracted tympanograms bilaterally. Given the asymmetry, I recommend MRI of the IAC. We will contact patient with results and dictate further management. In regards to the nasal polyps, I suspect she will have good improvement of symptoms with the [address]. She will begin the injections in the near future. If she does have any persistent issues may prescribe another course of prednisone. May forego nasal steroid rinse at this time given insurance issues. - MRI IAC POSTERIOR FOSSA W WO CONTRAST; Future    2. Sensorineural hearing loss (SNHL) of right ear with unrestricted hearing of left ear      3. Churg-Angela syndrome ([address]      4. Chronic pansinusitis      5. Sinusitis with nasal polyps      6. Severe asthma, unspecified whether complicated, unspecified whether persistent      Return in about 6 months (around 5/15/2023). Portions of this note were dictated using Dragon.  There may be linguistic errors secondary to the use of this program.

## 2022-12-20 ENCOUNTER — TELEPHONE (OUTPATIENT)
Dept: ENT CLINIC | Age: 47
End: 2022-12-20

## 2022-12-20 NOTE — TELEPHONE ENCOUNTER
----- Message from Franko Martinez DO sent at 12/20/2022  8:52 AM EST -----  MRI shows no problem with the inner ears. Persistent sinus polyp inflammation as noted. Follow up in May as scheduled. Please have proscan push images.

## 2022-12-20 NOTE — TELEPHONE ENCOUNTER
Left message on patient machine regarding his/her MRI results.      Images have been pushed through pacs

## 2023-07-06 ENCOUNTER — TELEPHONE (OUTPATIENT)
Dept: PULMONOLOGY | Age: 48
End: 2023-07-06

## 2023-07-06 ENCOUNTER — OFFICE VISIT (OUTPATIENT)
Dept: ENT CLINIC | Age: 48
End: 2023-07-06
Payer: COMMERCIAL

## 2023-07-06 VITALS
BODY MASS INDEX: 28.25 KG/M2 | HEIGHT: 67 IN | DIASTOLIC BLOOD PRESSURE: 61 MMHG | WEIGHT: 180 LBS | SYSTOLIC BLOOD PRESSURE: 107 MMHG | HEART RATE: 70 BPM | TEMPERATURE: 97.6 F | OXYGEN SATURATION: 97 %

## 2023-07-06 DIAGNOSIS — J32.4 CHRONIC PANSINUSITIS: Primary | ICD-10-CM

## 2023-07-06 DIAGNOSIS — J33.9 SINUSITIS WITH NASAL POLYPS: ICD-10-CM

## 2023-07-06 DIAGNOSIS — H69.83 DYSFUNCTION OF BOTH EUSTACHIAN TUBES: ICD-10-CM

## 2023-07-06 DIAGNOSIS — H90.71 MIXED CONDUCTIVE AND SENSORINEURAL HEARING LOSS OF RIGHT EAR WITH UNRESTRICTED HEARING OF LEFT EAR: ICD-10-CM

## 2023-07-06 DIAGNOSIS — J45.909 SEVERE ASTHMA, UNSPECIFIED WHETHER COMPLICATED, UNSPECIFIED WHETHER PERSISTENT: ICD-10-CM

## 2023-07-06 DIAGNOSIS — J32.9 SINUSITIS WITH NASAL POLYPS: ICD-10-CM

## 2023-07-06 DIAGNOSIS — H65.21 RIGHT CHRONIC SEROUS OTITIS MEDIA: ICD-10-CM

## 2023-07-06 PROCEDURE — 99214 OFFICE O/P EST MOD 30 MIN: CPT | Performed by: OTOLARYNGOLOGY

## 2023-07-06 RX ORDER — PREDNISONE 10 MG/1
TABLET ORAL
Qty: 34 TABLET | Refills: 0 | Status: SHIPPED | OUTPATIENT
Start: 2023-07-06

## 2023-07-06 RX ORDER — BUDESONIDE 0.5 MG/2ML
INHALANT ORAL
Qty: 60 EACH | Refills: 5 | Status: SHIPPED | OUTPATIENT
Start: 2023-07-06

## 2023-07-06 ASSESSMENT — ENCOUNTER SYMPTOMS
COLOR CHANGE: 0
CHOKING: 0
STRIDOR: 0
EYE REDNESS: 0
PHOTOPHOBIA: 0
NAUSEA: 0
DIARRHEA: 0
COUGH: 0
VOICE CHANGE: 0
EYE PAIN: 0
SINUS PAIN: 0
SINUS PRESSURE: 1
RHINORRHEA: 0
TROUBLE SWALLOWING: 0
FACIAL SWELLING: 0
EYE ITCHING: 0
SHORTNESS OF BREATH: 0
SORE THROAT: 0

## 2023-07-06 NOTE — PROGRESS NOTES
[facility]  [address], [address] [facility], [address] [place] [address]  P: [phone]  F: [phone]       Patient     Debbie Leija  1975    ChiefComplaint     Chief Complaint   Patient presents with    Follow-up     Patient is here today because she the right side of her head and face feel congested and her right ear she is having difficulty hearing out of       History of Present Illness     Germaine Sage is a pleasant 50 y.o. female with history of chronic sinusitis, nasal polyposis, underwent endoscopic sinus surgery December 2020 with myself. She has been experiencing right-sided sinonasal congestion, pressure, right ear pressure and decreased hearing. Previous audiogram from November 22 revealed asymmetric right-sided hearing loss. Negative tympanograms. She had an MRI afterwards which revealed no CPA tumor. She states since then she feels like the hearing in the right ear has been getting worse. On MRI there was a middle ear and mastoid effusion that was not noted on the audiogram.  She has been using fluticasone and Nucala. She is currently taking 5 mg of prednisone daily for Churg-Angela.     Past Medical History     Past Medical History:   Diagnosis Date    Allergic rhinitis     Asthma     being evaluated 4/2016    Meniere disease        Past Surgical History     Past Surgical History:   Procedure Laterality Date    DILATION AND CURETTAGE OF UTERUS      ENDOSCOPY, COLON, DIAGNOSTIC  4-    bronchscopy w/ BAL    SINUS ENDOSCOPY Bilateral 12/28/2020    REVISION BILATERAL MAXILLARY ANTROSTOMY WITH TISSUE REMOVAL, BILATERAL ETHMOIDECTOMY WITH SPHENOIDOTOMY WITH TISSUE REMOVAL, FRONTAL SINUS EXPLORATION, BILATERAL SUBMUCOUS TURBINATE RESECTION performed by Kasia Diop DO at [address] [facility] Bilateral 04/26/2017    BILATERAL FRONTAL SINUSOTOMIES WITH REMOVAL OF CONTENTS, BILATERAL ETHMOIDECTOMIES WITH REMOVAL OF CONTENTS, BILATERAL MAXILLARY ANTHROSTOMIES

## 2023-07-10 NOTE — TELEPHONE ENCOUNTER
The medication was denied due to unmet criteria, she must try and fail the following drug alternatives (pulmicort flexhaler, flovent HFA, qvar redhaler, arnuity ellipta)

## 2023-07-14 NOTE — TELEPHONE ENCOUNTER
The medication is APPROVED.  7/13/2023- 1/9/2024. If this requires a response please respond to the pool ( P MHCX [address] Amari Styles). Thank you please advise patient.

## 2023-07-18 ENCOUNTER — PROCEDURE VISIT (OUTPATIENT)
Dept: ENT CLINIC | Age: 48
End: 2023-07-18

## 2023-07-18 VITALS
HEART RATE: 60 BPM | WEIGHT: 169.2 LBS | DIASTOLIC BLOOD PRESSURE: 60 MMHG | TEMPERATURE: 97.1 F | HEIGHT: 66 IN | BODY MASS INDEX: 27.19 KG/M2 | SYSTOLIC BLOOD PRESSURE: 107 MMHG

## 2023-07-18 DIAGNOSIS — J33.9 SINUSITIS WITH NASAL POLYPS: ICD-10-CM

## 2023-07-18 DIAGNOSIS — J32.9 SINUSITIS WITH NASAL POLYPS: ICD-10-CM

## 2023-07-18 DIAGNOSIS — H65.21 RIGHT CHRONIC SEROUS OTITIS MEDIA: Primary | ICD-10-CM

## 2023-07-18 DIAGNOSIS — J32.4 CHRONIC PANSINUSITIS: ICD-10-CM

## 2023-07-18 DIAGNOSIS — J45.909 SEVERE ASTHMA, UNSPECIFIED WHETHER COMPLICATED, UNSPECIFIED WHETHER PERSISTENT: ICD-10-CM

## 2023-07-18 RX ORDER — BUDESONIDE 0.5 MG/2ML
INHALANT ORAL
Qty: 60 EACH | Refills: 5 | Status: SHIPPED | OUTPATIENT
Start: 2023-07-18

## 2023-07-18 RX ORDER — OFLOXACIN 3 MG/ML
SOLUTION/ DROPS OPHTHALMIC
Qty: 1 EACH | Refills: 1 | Status: SHIPPED | OUTPATIENT
Start: 2023-07-18

## 2023-07-18 NOTE — PROGRESS NOTES
PE Tube    Pre op Dx: right-sided serous otitis media  Post Op: Same  Procedure: right-sided Myringotomy with tympanostomy tube placement  Consent: written obtained  Surgeon: Josef Ley DO  Description:  With the use of an operating microscopy and a 4mm speculum, the external auditory canals were examined bilaterally. Any cerumen was removed using instrumentation. The tympanic membranes were visualize bilaterally. Topical phenol was applied to the anterior-inferior quadrant of right-sided tympanic membrane(s). A myringotomy knife was used to make a radial incision in the tympanic membrane(s). A 3-suction was used to suction out fluid from the middle ear space. An alligator forceps was used to place a paparella PE tube in the myringotomy. The patient tolerated the procedure well. There were no complications with the procedure.       Post procedure instructions discussed

## 2023-07-19 NOTE — TELEPHONE ENCOUNTER
Spoke to pharmacy and they mentioned having to order the budesonide and I as so called patient informing her to keep her ears open for a call from the pharmacy

## 2023-10-24 ENCOUNTER — OFFICE VISIT (OUTPATIENT)
Dept: ENT CLINIC | Age: 48
End: 2023-10-24
Payer: COMMERCIAL

## 2023-10-24 VITALS
HEART RATE: 80 BPM | WEIGHT: 164 LBS | DIASTOLIC BLOOD PRESSURE: 67 MMHG | TEMPERATURE: 98.3 F | OXYGEN SATURATION: 98 % | RESPIRATION RATE: 16 BRPM | BODY MASS INDEX: 26.36 KG/M2 | HEIGHT: 66 IN | SYSTOLIC BLOOD PRESSURE: 100 MMHG

## 2023-10-24 DIAGNOSIS — T85.898A OCCLUSION OF MYRINGOTOMY TUBE: ICD-10-CM

## 2023-10-24 DIAGNOSIS — J33.9 SINUSITIS WITH NASAL POLYPS: ICD-10-CM

## 2023-10-24 DIAGNOSIS — H65.21 RIGHT CHRONIC SEROUS OTITIS MEDIA: Primary | ICD-10-CM

## 2023-10-24 DIAGNOSIS — H69.93 DYSFUNCTION OF BOTH EUSTACHIAN TUBES: ICD-10-CM

## 2023-10-24 DIAGNOSIS — J32.9 SINUSITIS WITH NASAL POLYPS: ICD-10-CM

## 2023-10-24 PROCEDURE — 99214 OFFICE O/P EST MOD 30 MIN: CPT | Performed by: OTOLARYNGOLOGY

## 2023-10-24 PROCEDURE — 92504 EAR MICROSCOPY EXAMINATION: CPT | Performed by: OTOLARYNGOLOGY

## 2023-10-24 RX ORDER — FLUTICASONE PROPIONATE 93 UG/1
2 SPRAY, METERED NASAL 2 TIMES DAILY
Qty: 32 ML | Refills: 1 | Status: SHIPPED | OUTPATIENT
Start: 2023-10-24

## 2023-10-24 ASSESSMENT — ENCOUNTER SYMPTOMS
EYE PAIN: 0
RHINORRHEA: 0
COUGH: 0
EYE REDNESS: 0
VOICE CHANGE: 0
CHOKING: 0
SORE THROAT: 0
SHORTNESS OF BREATH: 0
FACIAL SWELLING: 0
DIARRHEA: 0
SINUS PAIN: 0
SINUS PRESSURE: 0
COLOR CHANGE: 0
NAUSEA: 0
PHOTOPHOBIA: 0
STRIDOR: 0
EYE ITCHING: 0
TROUBLE SWALLOWING: 0

## 2023-10-24 NOTE — PROGRESS NOTES
[facility]  [address] [facility], [address] [place] [address], [address] [place] [address]  P: [phone]  F: [phone]       Patient     Shira Dawson  1975    ChiefComplaint     Chief Complaint   Patient presents with    Ear Problem     Thinks that the tube in the right ear is infected and thinks that there is fluid in the left ear        History of Present Illness     Germaine Sage is a pleasant 50 y.o. female here for 3 weeks of right-sided intermittent ear pain, muffled hearing. She has not noticed any otorrhea. She has been experiencing some pressure and discomfort in the left ear as well. She is experiencing persistent issues with sinonasal polyps. She tried to fill Xhance prescription in the past, but no local pharmacies have it in stock. She is never tried mail-order pharmacy. She has been unable to try Dupixent due to unavailability of the [place] and insurance restrictions.     Past Medical History     Past Medical History:   Diagnosis Date    Allergic rhinitis     Asthma     being evaluated 4/2016    Meniere disease        Past Surgical History     Past Surgical History:   Procedure Laterality Date    DILATION AND CURETTAGE OF UTERUS      ENDOSCOPY, COLON, DIAGNOSTIC  4-    bronchscopy w/ BAL    SINUS ENDOSCOPY Bilateral 12/28/2020    REVISION BILATERAL MAXILLARY ANTROSTOMY WITH TISSUE REMOVAL, BILATERAL ETHMOIDECTOMY WITH SPHENOIDOTOMY WITH TISSUE REMOVAL, FRONTAL SINUS EXPLORATION, BILATERAL SUBMUCOUS TURBINATE RESECTION performed by Josef Ley DO at [facility] Bilateral 04/26/2017    BILATERAL FRONTAL SINUSOTOMIES WITH REMOVAL OF CONTENTS, BILATERAL ETHMOIDECTOMIES WITH REMOVAL OF CONTENTS, BILATERAL MAXILLARY ANTHROSTOMIES WITH REMOVAL OF CONTENTS       Family History     Family History   Problem Relation Age of Onset    Heart Disease Father        Social History     Social History     Socioeconomic History    Marital status:      Spouse name: Not on

## 2023-11-03 ENCOUNTER — PROCEDURE VISIT (OUTPATIENT)
Dept: ENT CLINIC | Age: 48
End: 2023-11-03

## 2023-11-03 ENCOUNTER — TELEPHONE (OUTPATIENT)
Dept: ENT CLINIC | Age: 48
End: 2023-11-03

## 2023-11-03 VITALS
HEIGHT: 67 IN | SYSTOLIC BLOOD PRESSURE: 112 MMHG | DIASTOLIC BLOOD PRESSURE: 66 MMHG | WEIGHT: 165.2 LBS | HEART RATE: 75 BPM | TEMPERATURE: 97.8 F | BODY MASS INDEX: 25.93 KG/M2

## 2023-11-03 DIAGNOSIS — H69.93 DYSFUNCTION OF BOTH EUSTACHIAN TUBES: Primary | ICD-10-CM

## 2023-11-03 DIAGNOSIS — H65.23 BILATERAL CHRONIC SEROUS OTITIS MEDIA: ICD-10-CM

## 2023-11-03 RX ORDER — CIPROFLOXACIN AND DEXAMETHASONE 3; 1 MG/ML; MG/ML
4 SUSPENSION/ DROPS AURICULAR (OTIC) 2 TIMES DAILY
Qty: 1 EACH | Refills: 0 | Status: SHIPPED | OUTPATIENT
Start: 2023-11-03 | End: 2023-11-10

## 2023-11-03 NOTE — PROGRESS NOTES
PE Tube    Pre op Dx: bilateral serous otitis media  Post Op: Same  Procedure: bilateral Myringotomy with tympanostomy tube placement  Consent: written obtained  Surgeon: Kimberli Schroeder DO  Description:  With the use of an operating microscopy and a 4mm speculum, the external auditory canals were examined bilaterally. Any cerumen was removed using instrumentation. The tympanic membranes were visualize bilaterally, revealing effusion, occluded right PE tube. The procedure began on the right. Microscopic visualization, an alligator forceps used to remove the right PE tube that was occluded. Thick mucoid effusion was suctioned from the middle ear space. Topical phenol was applied to the existing myringotomy. The myringotomy knife was then used to extend the incision for placement of a new tube. An alligator forcep was then used to place a Kandace collar-button tube within the myringotomy. Attention was then turned to the left. Topical phenol was applied to the anterior-inferior quadrant of left-sided tympanic membrane(s). A myringotomy knife was used to make a radial incision in the tympanic membrane(s). A 3-suction was used to suction out fluid from the middle ear space. An alligator forceps was used to place a Kandace collar-button PE tube in the myringotomy. The patient tolerated the procedure well. There were no complications with the procedure.

## 2023-11-03 NOTE — TELEPHONE ENCOUNTER
Patient called says her pharmacy did not receive the prescription for xhance. Please send in the medicine again to [facility] pharmacy on file and give the pt a call once it has been sent. Thanks!

## 2023-11-06 ENCOUNTER — TELEPHONE (OUTPATIENT)
Dept: ADMINISTRATIVE | Age: 48
End: 2023-11-06

## 2023-11-06 NOTE — TELEPHONE ENCOUNTER
Submitted PA for Ciprofloxacin-dexAMETHasone 0.3-0.1% suspension  Via [facility] Key: [id number] STATUS: PENDING.    Follow up done daily; if no response in three days we will refax for status check.  If another three days goes by with no response we will call the insurance for status.

## 2023-11-07 NOTE — TELEPHONE ENCOUNTER
The medication is APPROVED. LETTER IN MEDIA    If this requires a response please respond to the pool ( P MHCX PSC MEDICATION PRE-AUTH).      Thank you please advise patient.

## 2023-11-28 ENCOUNTER — OFFICE VISIT (OUTPATIENT)
Dept: ENT CLINIC | Age: 48
End: 2023-11-28
Payer: COMMERCIAL

## 2023-11-28 VITALS
TEMPERATURE: 97.7 F | DIASTOLIC BLOOD PRESSURE: 76 MMHG | HEIGHT: 66 IN | RESPIRATION RATE: 16 BRPM | SYSTOLIC BLOOD PRESSURE: 96 MMHG | BODY MASS INDEX: 26.58 KG/M2 | WEIGHT: 165.4 LBS | HEART RATE: 89 BPM

## 2023-11-28 DIAGNOSIS — J32.9 SINUSITIS WITH NASAL POLYPS: ICD-10-CM

## 2023-11-28 DIAGNOSIS — H69.93 DYSFUNCTION OF BOTH EUSTACHIAN TUBES: Primary | ICD-10-CM

## 2023-11-28 DIAGNOSIS — J33.9 NASAL POLYPOSIS: ICD-10-CM

## 2023-11-28 DIAGNOSIS — J32.4 CHRONIC PANSINUSITIS: ICD-10-CM

## 2023-11-28 DIAGNOSIS — H65.23 BILATERAL CHRONIC SEROUS OTITIS MEDIA: ICD-10-CM

## 2023-11-28 DIAGNOSIS — J33.9 SINUSITIS WITH NASAL POLYPS: ICD-10-CM

## 2023-11-28 DIAGNOSIS — Z96.22 PATENT TYMPANOSTOMY TUBE: ICD-10-CM

## 2023-11-28 PROCEDURE — 99214 OFFICE O/P EST MOD 30 MIN: CPT | Performed by: OTOLARYNGOLOGY

## 2023-11-28 PROCEDURE — 92504 EAR MICROSCOPY EXAMINATION: CPT | Performed by: OTOLARYNGOLOGY

## 2023-11-28 RX ORDER — AMOXICILLIN AND CLAVULANATE POTASSIUM 875; 125 MG/1; MG/1
1 TABLET, FILM COATED ORAL 2 TIMES DAILY
Qty: 28 TABLET | Refills: 0 | Status: SHIPPED | OUTPATIENT
Start: 2023-11-28 | End: 2023-12-12

## 2023-11-28 RX ORDER — FLUTICASONE PROPIONATE 93 UG/1
2 SPRAY, METERED NASAL 2 TIMES DAILY
Qty: 32 ML | Refills: 5 | Status: SHIPPED | OUTPATIENT
Start: 2023-11-28

## 2023-11-28 ASSESSMENT — ENCOUNTER SYMPTOMS
SHORTNESS OF BREATH: 0
DIARRHEA: 0
SORE THROAT: 0
EYE PAIN: 0
SINUS PRESSURE: 0
EYE REDNESS: 0
NAUSEA: 0
CHOKING: 0
COUGH: 0
EYE ITCHING: 0
TROUBLE SWALLOWING: 0
STRIDOR: 0
SINUS PAIN: 0
COLOR CHANGE: 0
VOICE CHANGE: 0
PHOTOPHOBIA: 0
RHINORRHEA: 0
FACIAL SWELLING: 0

## 2023-11-28 NOTE — PROGRESS NOTES
[facility]  [address], [address] [facility], [address] [place] [address]  P: [phone]  F: [phone]       Patient     Latosha Li  1975    ChiefComplaint     Chief Complaint   Patient presents with    Post-Op Check     Follow up from tube placement 2 weeks ago       History of Present Illness     Germaine Sage is a pleasant 50 y.o. female here for 3-week follow-up for bilateral ear tube placement. Has been having some mild persistent mucus drainage from the ears as well as some muffled hearing. She is also been experiencing worsening nasal congestion. She is currently on a decreased taper for her prednisone for Churg-Angela. She is concerned she may be getting a sinus infection at this time. She feels her nasal polyps are worsening. She still has not received a prescription for Xhance.     Past Medical History     Past Medical History:   Diagnosis Date    Allergic rhinitis     Asthma     being evaluated 4/2016    Hearing loss     Meniere disease     Sinusitis     Tinnitus        Past Surgical History     Past Surgical History:   Procedure Laterality Date    BRONCHOSCOPY      DILATION AND CURETTAGE OF UTERUS      ENDOSCOPY, COLON, DIAGNOSTIC  04/21/2016    bronchscopy w/ BAL    SINUS ENDOSCOPY Bilateral 12/28/2020    REVISION BILATERAL MAXILLARY ANTROSTOMY WITH TISSUE REMOVAL, BILATERAL ETHMOIDECTOMY WITH SPHENOIDOTOMY WITH TISSUE REMOVAL, FRONTAL SINUS EXPLORATION, BILATERAL SUBMUCOUS TURBINATE RESECTION performed by Brennon Hebert DO at [address] Bilateral 04/26/2017    BILATERAL FRONTAL SINUSOTOMIES WITH REMOVAL OF CONTENTS, BILATERAL ETHMOIDECTOMIES WITH REMOVAL OF CONTENTS, BILATERAL MAXILLARY ANTHROSTOMIES WITH REMOVAL OF CONTENTS       Family History     Family History   Problem Relation Age of Onset    Heart Disease Father        Social History     Social History     Socioeconomic History    Marital status:      Spouse name: Not on file    Number

## 2023-12-08 ENCOUNTER — TELEPHONE (OUTPATIENT)
Dept: ADMINISTRATIVE | Age: 48
End: 2023-12-08

## 2023-12-08 NOTE — TELEPHONE ENCOUNTER
The medication is APPROVED.    Approved today  Approved. Approved for XHANCE Exhaler Susp 93MCG/ACT, quantity up to 32 per 30 days, under the pharmacy benefit. The drug has been approved from 12/08/2023 to 06/05/2024.     If this requires a response please respond to the pool ( P MHCX PSC MEDICATION PRE-AUTH).      Thank you please advise patient.

## 2023-12-08 NOTE — TELEPHONE ENCOUNTER
Submitted PA for Xhance 93MCG/ACT exhaler suspension  Via [facility] Key: [id number] STATUS: PENDING.    Follow up done daily; if no response in three days we will refax for status check.  If another three days goes by with no response we will call the insurance for status.

## 2024-03-05 ENCOUNTER — OFFICE VISIT (OUTPATIENT)
Dept: ENT CLINIC | Age: 49
End: 2024-03-05
Payer: COMMERCIAL

## 2024-03-05 VITALS
HEART RATE: 82 BPM | SYSTOLIC BLOOD PRESSURE: 99 MMHG | RESPIRATION RATE: 16 BRPM | DIASTOLIC BLOOD PRESSURE: 65 MMHG | WEIGHT: 159 LBS | HEIGHT: 66 IN | TEMPERATURE: 97.5 F | BODY MASS INDEX: 25.55 KG/M2

## 2024-03-05 DIAGNOSIS — H65.23 BILATERAL CHRONIC SEROUS OTITIS MEDIA: ICD-10-CM

## 2024-03-05 DIAGNOSIS — H69.93 DYSFUNCTION OF BOTH EUSTACHIAN TUBES: Primary | ICD-10-CM

## 2024-03-05 DIAGNOSIS — Z96.22 PATENT TYMPANOSTOMY TUBE: ICD-10-CM

## 2024-03-05 DIAGNOSIS — J32.4 CHRONIC PANSINUSITIS: ICD-10-CM

## 2024-03-05 DIAGNOSIS — J33.9 NASAL POLYPOSIS: ICD-10-CM

## 2024-03-05 PROCEDURE — 99213 OFFICE O/P EST LOW 20 MIN: CPT | Performed by: OTOLARYNGOLOGY

## 2024-03-05 ASSESSMENT — ENCOUNTER SYMPTOMS
PHOTOPHOBIA: 0
COLOR CHANGE: 0
SORE THROAT: 0
SINUS PAIN: 0
RHINORRHEA: 0
COUGH: 0
FACIAL SWELLING: 0
SHORTNESS OF BREATH: 0
EYE PAIN: 0
SINUS PRESSURE: 0
CHOKING: 0
STRIDOR: 0
TROUBLE SWALLOWING: 0
NAUSEA: 0
VOICE CHANGE: 0
EYE ITCHING: 0
DIARRHEA: 0
EYE REDNESS: 0

## 2024-03-05 NOTE — PROGRESS NOTES
[facility]  [address] , [address]  [place] [address]  P: [phone]  F: [phone]       Patient     Germaine Sage  1975    ChiefComplaint     Chief Complaint   Patient presents with    Other     FOLLOW UP        History of Present Illness     Germaine Sage is a pleasant 48 y.o. female known to me with history of Churg-Angela, chronic sinusitis with nasal polyposis and bilateral middle ear effusion.  Ear tubes placed November 2023.  At previous visit, we were trying to initiate her Xhance.  Additionally, she had a sinus infection at her previous visit.  I prescribed a course of Augmentin.  Since initiating Xhance, she has noticed improvement of her symptoms in terms of congestion and pressure.  Additionally, she restarted prednisone for her Churg-Angela.  She feels that this in combination with the ear tubes has helped her ear symptoms.  However she is having some lightheaded/dizziness when she sits up from bed.  It is not long-lasting and does not affect her daily activities.    Past Medical History     Past Medical History:   Diagnosis Date    Allergic rhinitis     Asthma     being evaluated 4/2016    GERD (gastroesophageal reflux disease)     Hearing loss     Lung disease     Meniere disease     Sinusitis     Tinnitus        Past Surgical History     Past Surgical History:   Procedure Laterality Date    BRONCHOSCOPY      DILATION AND CURETTAGE OF UTERUS      ENDOSCOPY, COLON, DIAGNOSTIC  04/21/2016    bronchscopy w/ BAL    SINUS ENDOSCOPY Bilateral 12/28/2020    REVISION BILATERAL MAXILLARY ANTROSTOMY WITH TISSUE REMOVAL, BILATERAL ETHMOIDECTOMY WITH SPHENOIDOTOMY WITH TISSUE REMOVAL, FRONTAL SINUS EXPLORATION, BILATERAL SUBMUCOUS TURBINATE RESECTION performed by Nicholas Mabry DO at [facility] OR    SINUS SURGERY Bilateral 04/26/2017    BILATERAL FRONTAL SINUSOTOMIES WITH REMOVAL OF CONTENTS, BILATERAL ETHMOIDECTOMIES WITH REMOVAL OF CONTENTS, BILATERAL MAXILLARY

## 2024-07-09 ENCOUNTER — OFFICE VISIT (OUTPATIENT)
Dept: ENT CLINIC | Age: 49
End: 2024-07-09
Payer: COMMERCIAL

## 2024-07-09 VITALS
HEIGHT: 66 IN | BODY MASS INDEX: 26.03 KG/M2 | WEIGHT: 162 LBS | TEMPERATURE: 97.1 F | DIASTOLIC BLOOD PRESSURE: 64 MMHG | RESPIRATION RATE: 16 BRPM | SYSTOLIC BLOOD PRESSURE: 112 MMHG | HEART RATE: 63 BPM

## 2024-07-09 DIAGNOSIS — Z96.22 PATENT TYMPANOSTOMY TUBE: ICD-10-CM

## 2024-07-09 DIAGNOSIS — H61.21 IMPACTED CERUMEN OF RIGHT EAR: ICD-10-CM

## 2024-07-09 DIAGNOSIS — J32.9 SINUSITIS WITH NASAL POLYPS: ICD-10-CM

## 2024-07-09 DIAGNOSIS — J33.9 SINUSITIS WITH NASAL POLYPS: ICD-10-CM

## 2024-07-09 DIAGNOSIS — H69.93 DYSFUNCTION OF BOTH EUSTACHIAN TUBES: Primary | ICD-10-CM

## 2024-07-09 PROCEDURE — 69210 REMOVE IMPACTED EAR WAX UNI: CPT | Performed by: OTOLARYNGOLOGY

## 2024-07-09 PROCEDURE — 99213 OFFICE O/P EST LOW 20 MIN: CPT | Performed by: OTOLARYNGOLOGY

## 2024-07-09 ASSESSMENT — ENCOUNTER SYMPTOMS
COLOR CHANGE: 0
RHINORRHEA: 0
COUGH: 0
STRIDOR: 0
DIARRHEA: 0
EYE PAIN: 0
SINUS PRESSURE: 0
PHOTOPHOBIA: 0
FACIAL SWELLING: 0
EYE REDNESS: 0
NAUSEA: 0
EYE ITCHING: 0
TROUBLE SWALLOWING: 0
SORE THROAT: 0
SHORTNESS OF BREATH: 0
VOICE CHANGE: 0
SINUS PAIN: 0
CHOKING: 0

## 2024-12-03 ENCOUNTER — OFFICE VISIT (OUTPATIENT)
Dept: ENT CLINIC | Age: 49
End: 2024-12-03
Payer: COMMERCIAL

## 2024-12-03 VITALS
HEIGHT: 66 IN | WEIGHT: 166 LBS | DIASTOLIC BLOOD PRESSURE: 86 MMHG | BODY MASS INDEX: 26.68 KG/M2 | RESPIRATION RATE: 16 BRPM | SYSTOLIC BLOOD PRESSURE: 134 MMHG | TEMPERATURE: 97.5 F | HEART RATE: 86 BPM

## 2024-12-03 DIAGNOSIS — T85.618A NON-FUNCTIONING TYMPANOSTOMY TUBE, INITIAL ENCOUNTER: ICD-10-CM

## 2024-12-03 DIAGNOSIS — J33.9 SINUSITIS WITH NASAL POLYPS: ICD-10-CM

## 2024-12-03 DIAGNOSIS — H65.93 FLUID LEVEL BEHIND TYMPANIC MEMBRANE OF BOTH EARS: ICD-10-CM

## 2024-12-03 DIAGNOSIS — J32.9 SINUSITIS WITH NASAL POLYPS: ICD-10-CM

## 2024-12-03 DIAGNOSIS — H69.93 DYSFUNCTION OF BOTH EUSTACHIAN TUBES: Primary | ICD-10-CM

## 2024-12-03 PROCEDURE — 99214 OFFICE O/P EST MOD 30 MIN: CPT | Performed by: OTOLARYNGOLOGY

## 2024-12-03 PROCEDURE — 92504 EAR MICROSCOPY EXAMINATION: CPT | Performed by: OTOLARYNGOLOGY

## 2024-12-03 RX ORDER — CLARITHROMYCIN 500 MG/1
500 TABLET ORAL 2 TIMES DAILY
Qty: 28 TABLET | Refills: 0 | Status: SHIPPED | OUTPATIENT
Start: 2024-12-03 | End: 2024-12-17

## 2024-12-03 RX ORDER — PREDNISONE 10 MG/1
40 TABLET ORAL DAILY
Qty: 20 TABLET | Refills: 0 | Status: SHIPPED | OUTPATIENT
Start: 2024-12-03 | End: 2024-12-08

## 2024-12-03 ASSESSMENT — ENCOUNTER SYMPTOMS
SINUS PRESSURE: 1
PHOTOPHOBIA: 0
SORE THROAT: 0
VOICE CHANGE: 0
CHOKING: 0
COLOR CHANGE: 0
RHINORRHEA: 0
NAUSEA: 0
SINUS PAIN: 0
EYE ITCHING: 0
DIARRHEA: 0
EYE REDNESS: 0
SHORTNESS OF BREATH: 0
EYE PAIN: 0
TROUBLE SWALLOWING: 0
COUGH: 0
STRIDOR: 0
FACIAL SWELLING: 0

## 2024-12-03 NOTE — PROGRESS NOTES
[facility]  [address] , [address]  [place] [address]  P: [phone]  F: [phone]       Patient     Germaine Sage  1975    ChiefComplaint     Chief Complaint   Patient presents with    Follow-up     Follow up checked ear tubes ears do feel a little clogged       History of Present Illness     Germaine Sage is a pleasant 49 y.o. female here for follow-up for PE tube check.  Ears have felt full and hearing is felt muffled.  She is also had worsened sinonasal congestion and postnasal drainage.  She has been using Xhance for 1 year now.  She is also on Nucala for Churg-Angela syndrome.    Past Medical History     Past Medical History:   Diagnosis Date    Allergic rhinitis     Asthma     being evaluated 4/2016    GERD (gastroesophageal reflux disease)     Hearing loss     Lung disease     Meniere disease     Sinusitis     Tinnitus        Past Surgical History     Past Surgical History:   Procedure Laterality Date    BRONCHOSCOPY      DILATION AND CURETTAGE OF UTERUS      ENDOSCOPY, COLON, DIAGNOSTIC  04/21/2016    bronchscopy w/ BAL    SINUS ENDOSCOPY Bilateral 12/28/2020    REVISION BILATERAL MAXILLARY ANTROSTOMY WITH TISSUE REMOVAL, BILATERAL ETHMOIDECTOMY WITH SPHENOIDOTOMY WITH TISSUE REMOVAL, FRONTAL SINUS EXPLORATION, BILATERAL SUBMUCOUS TURBINATE RESECTION performed by Nicholas Mabry DO at [facility] OR    SINUS SURGERY Bilateral 04/26/2017    BILATERAL FRONTAL SINUSOTOMIES WITH REMOVAL OF CONTENTS, BILATERAL ETHMOIDECTOMIES WITH REMOVAL OF CONTENTS, BILATERAL MAXILLARY ANTHROSTOMIES WITH REMOVAL OF CONTENTS       Family History     Family History   Problem Relation Age of Onset    Heart Disease Father        Social History     Social History     Socioeconomic History    Marital status:      Spouse name: Not on file    Number of children: Not on file    Years of education: Not on file    Highest education level: Not on file   Occupational History    Not on file

## 2024-12-04 ENCOUNTER — PREP FOR PROCEDURE (OUTPATIENT)
Dept: ENT CLINIC | Age: 49
End: 2024-12-04

## 2024-12-04 DIAGNOSIS — H65.93 FLUID LEVEL BEHIND TYMPANIC MEMBRANE OF BOTH EARS: ICD-10-CM

## 2024-12-04 DIAGNOSIS — H69.93 DYSFUNCTION OF BOTH EUSTACHIAN TUBES: ICD-10-CM

## 2024-12-12 RX ORDER — SODIUM CHLORIDE 9 MG/ML
INJECTION, SOLUTION INTRAVENOUS PRN
Status: CANCELLED | OUTPATIENT
Start: 2024-12-16

## 2024-12-12 RX ORDER — PREDNISONE 5 MG/1
5 TABLET ORAL DAILY
COMMUNITY

## 2024-12-12 RX ORDER — SODIUM CHLORIDE 0.9 % (FLUSH) 0.9 %
5-40 SYRINGE (ML) INJECTION EVERY 12 HOURS SCHEDULED
Status: CANCELLED | OUTPATIENT
Start: 2024-12-16

## 2024-12-12 RX ORDER — OXYMETAZOLINE HYDROCHLORIDE 0.05 G/100ML
2 SPRAY NASAL
Status: CANCELLED | OUTPATIENT
Start: 2024-12-16 | End: 2024-12-16

## 2024-12-12 RX ORDER — SODIUM CHLORIDE 0.9 % (FLUSH) 0.9 %
5-40 SYRINGE (ML) INJECTION PRN
Status: CANCELLED | OUTPATIENT
Start: 2024-12-16

## 2024-12-13 ENCOUNTER — ANESTHESIA EVENT (OUTPATIENT)
Dept: OPERATING ROOM | Age: 49
End: 2024-12-13
Payer: COMMERCIAL

## 2024-12-16 ENCOUNTER — HOSPITAL ENCOUNTER (OUTPATIENT)
Age: 49
Setting detail: OUTPATIENT SURGERY
Discharge: HOME | End: 2024-12-16
Attending: OTOLARYNGOLOGY | Admitting: OTOLARYNGOLOGY
Payer: COMMERCIAL

## 2024-12-16 ENCOUNTER — ANESTHESIA (OUTPATIENT)
Dept: OPERATING ROOM | Age: 49
End: 2024-12-16
Payer: COMMERCIAL

## 2024-12-16 VITALS
TEMPERATURE: 97.5 F | HEART RATE: 70 BPM | DIASTOLIC BLOOD PRESSURE: 57 MMHG | WEIGHT: 165.8 LBS | HEIGHT: 66 IN | BODY MASS INDEX: 26.65 KG/M2 | SYSTOLIC BLOOD PRESSURE: 101 MMHG | OXYGEN SATURATION: 98 % | RESPIRATION RATE: 16 BRPM

## 2024-12-16 PROCEDURE — 7100000001 HC PACU RECOVERY - ADDTL 15 MIN: Performed by: OTOLARYNGOLOGY

## 2024-12-16 PROCEDURE — 7100000011 HC PHASE II RECOVERY - ADDTL 15 MIN: Performed by: OTOLARYNGOLOGY

## 2024-12-16 PROCEDURE — 7100000000 HC PACU RECOVERY - FIRST 15 MIN: Performed by: OTOLARYNGOLOGY

## 2024-12-16 PROCEDURE — 3700000000 HC ANESTHESIA ATTENDED CARE: Performed by: OTOLARYNGOLOGY

## 2024-12-16 PROCEDURE — 3600000012 HC SURGERY LEVEL 2 ADDTL 15MIN: Performed by: OTOLARYNGOLOGY

## 2024-12-16 PROCEDURE — L8699 PROSTHETIC IMPLANT NOS: HCPCS | Performed by: OTOLARYNGOLOGY

## 2024-12-16 PROCEDURE — 6360000002 HC RX W HCPCS: Performed by: NURSE ANESTHETIST, CERTIFIED REGISTERED

## 2024-12-16 PROCEDURE — 2709999900 HC NON-CHARGEABLE SUPPLY: Performed by: OTOLARYNGOLOGY

## 2024-12-16 PROCEDURE — 2580000003 HC RX 258: Performed by: ANESTHESIOLOGY

## 2024-12-16 PROCEDURE — 3700000001 HC ADD 15 MINUTES (ANESTHESIA): Performed by: OTOLARYNGOLOGY

## 2024-12-16 PROCEDURE — 6370000000 HC RX 637 (ALT 250 FOR IP): Performed by: OTOLARYNGOLOGY

## 2024-12-16 PROCEDURE — 69436 CREATE EARDRUM OPENING: CPT | Performed by: OTOLARYNGOLOGY

## 2024-12-16 PROCEDURE — 3600000002 HC SURGERY LEVEL 2 BASE: Performed by: OTOLARYNGOLOGY

## 2024-12-16 PROCEDURE — 7100000010 HC PHASE II RECOVERY - FIRST 15 MIN: Performed by: OTOLARYNGOLOGY

## 2024-12-16 DEVICE — VENT TUBE 1016010 5PK GOODE T 12MM SILIC
Type: IMPLANTABLE DEVICE | Site: EAR | Status: FUNCTIONAL
Brand: GOODE T-TUBE®

## 2024-12-16 RX ORDER — OXYMETAZOLINE HYDROCHLORIDE 0.05 G/100ML
2 SPRAY NASAL
Status: COMPLETED | OUTPATIENT
Start: 2024-12-16 | End: 2024-12-16

## 2024-12-16 RX ORDER — SODIUM CHLORIDE 0.9 % (FLUSH) 0.9 %
5-40 SYRINGE (ML) INJECTION PRN
Status: DISCONTINUED | OUTPATIENT
Start: 2024-12-16 | End: 2024-12-16 | Stop reason: HOSPADM

## 2024-12-16 RX ORDER — PROPOFOL 10 MG/ML
INJECTION, EMULSION INTRAVENOUS
Status: DISCONTINUED | OUTPATIENT
Start: 2024-12-16 | End: 2024-12-16 | Stop reason: SDUPTHER

## 2024-12-16 RX ORDER — SODIUM CHLORIDE 0.9 % (FLUSH) 0.9 %
5-40 SYRINGE (ML) INJECTION EVERY 12 HOURS SCHEDULED
Status: DISCONTINUED | OUTPATIENT
Start: 2024-12-16 | End: 2024-12-16 | Stop reason: HOSPADM

## 2024-12-16 RX ORDER — HYDROMORPHONE HYDROCHLORIDE 1 MG/ML
0.5 INJECTION, SOLUTION INTRAMUSCULAR; INTRAVENOUS; SUBCUTANEOUS EVERY 5 MIN PRN
Status: DISCONTINUED | OUTPATIENT
Start: 2024-12-16 | End: 2024-12-16 | Stop reason: HOSPADM

## 2024-12-16 RX ORDER — LABETALOL HYDROCHLORIDE 5 MG/ML
10 INJECTION, SOLUTION INTRAVENOUS
Status: DISCONTINUED | OUTPATIENT
Start: 2024-12-16 | End: 2024-12-16 | Stop reason: HOSPADM

## 2024-12-16 RX ORDER — SODIUM CHLORIDE, SODIUM LACTATE, POTASSIUM CHLORIDE, CALCIUM CHLORIDE 600; 310; 30; 20 MG/100ML; MG/100ML; MG/100ML; MG/100ML
INJECTION, SOLUTION INTRAVENOUS CONTINUOUS
Status: DISCONTINUED | OUTPATIENT
Start: 2024-12-16 | End: 2024-12-16 | Stop reason: HOSPADM

## 2024-12-16 RX ORDER — HYDRALAZINE HYDROCHLORIDE 20 MG/ML
10 INJECTION INTRAMUSCULAR; INTRAVENOUS
Status: DISCONTINUED | OUTPATIENT
Start: 2024-12-16 | End: 2024-12-16 | Stop reason: HOSPADM

## 2024-12-16 RX ORDER — OFLOXACIN 3 MG/ML
SOLUTION/ DROPS OPHTHALMIC
Qty: 1 EACH | Refills: 0 | Status: SHIPPED | OUTPATIENT
Start: 2024-12-16

## 2024-12-16 RX ORDER — METOCLOPRAMIDE HYDROCHLORIDE 5 MG/ML
10 INJECTION INTRAMUSCULAR; INTRAVENOUS
Status: DISCONTINUED | OUTPATIENT
Start: 2024-12-16 | End: 2024-12-16 | Stop reason: HOSPADM

## 2024-12-16 RX ORDER — LIDOCAINE HYDROCHLORIDE 20 MG/ML
INJECTION, SOLUTION INFILTRATION; PERINEURAL
Status: DISCONTINUED | OUTPATIENT
Start: 2024-12-16 | End: 2024-12-16 | Stop reason: SDUPTHER

## 2024-12-16 RX ORDER — MEPERIDINE HYDROCHLORIDE 25 MG/ML
12.5 INJECTION INTRAMUSCULAR; INTRAVENOUS; SUBCUTANEOUS EVERY 5 MIN PRN
Status: DISCONTINUED | OUTPATIENT
Start: 2024-12-16 | End: 2024-12-16 | Stop reason: HOSPADM

## 2024-12-16 RX ORDER — NALOXONE HYDROCHLORIDE 0.4 MG/ML
INJECTION, SOLUTION INTRAMUSCULAR; INTRAVENOUS; SUBCUTANEOUS PRN
Status: DISCONTINUED | OUTPATIENT
Start: 2024-12-16 | End: 2024-12-16 | Stop reason: HOSPADM

## 2024-12-16 RX ORDER — SODIUM CHLORIDE 9 MG/ML
INJECTION, SOLUTION INTRAVENOUS PRN
Status: DISCONTINUED | OUTPATIENT
Start: 2024-12-16 | End: 2024-12-16 | Stop reason: HOSPADM

## 2024-12-16 RX ORDER — DIPHENHYDRAMINE HYDROCHLORIDE 50 MG/ML
12.5 INJECTION INTRAMUSCULAR; INTRAVENOUS
Status: DISCONTINUED | OUTPATIENT
Start: 2024-12-16 | End: 2024-12-16 | Stop reason: HOSPADM

## 2024-12-16 RX ORDER — EPINEPHRINE NASAL SOLUTION 1 MG/ML
SOLUTION NASAL PRN
Status: DISCONTINUED | OUTPATIENT
Start: 2024-12-16 | End: 2024-12-16 | Stop reason: ALTCHOICE

## 2024-12-16 RX ORDER — HYDROMORPHONE HYDROCHLORIDE 1 MG/ML
0.5 INJECTION, SOLUTION INTRAMUSCULAR; INTRAVENOUS; SUBCUTANEOUS EVERY 10 MIN PRN
Status: DISCONTINUED | OUTPATIENT
Start: 2024-12-16 | End: 2024-12-16 | Stop reason: HOSPADM

## 2024-12-16 RX ORDER — FLUTICASONE PROPIONATE AND SALMETEROL XINAFOATE 230; 21 UG/1; UG/1
2 AEROSOL, METERED RESPIRATORY (INHALATION) 2 TIMES DAILY
COMMUNITY

## 2024-12-16 RX ADMIN — PROPOFOL 50 MG: 10 INJECTION, EMULSION INTRAVENOUS at 13:45

## 2024-12-16 RX ADMIN — PROPOFOL 200 MG: 10 INJECTION, EMULSION INTRAVENOUS at 13:38

## 2024-12-16 RX ADMIN — PROPOFOL 50 MG: 10 INJECTION, EMULSION INTRAVENOUS at 13:42

## 2024-12-16 RX ADMIN — LIDOCAINE HYDROCHLORIDE 80 MG: 20 INJECTION, SOLUTION INFILTRATION; PERINEURAL at 13:38

## 2024-12-16 RX ADMIN — SODIUM CHLORIDE, POTASSIUM CHLORIDE, SODIUM LACTATE AND CALCIUM CHLORIDE: 600; 310; 30; 20 INJECTION, SOLUTION INTRAVENOUS at 12:29

## 2024-12-16 RX ADMIN — OXYMETAZOLINE HYDROCHLORIDE 2 SPRAY: 0.5 SPRAY NASAL at 12:34

## 2024-12-16 ASSESSMENT — PAIN - FUNCTIONAL ASSESSMENT
PAIN_FUNCTIONAL_ASSESSMENT: 0-10
PAIN_FUNCTIONAL_ASSESSMENT: NONE - DENIES PAIN

## 2024-12-16 NOTE — DISCHARGE INSTRUCTIONS
[facility]  Nicholas Mabry D.O.  [address]  [place], [place] [address] [phone]    Post-Op Ear Tube Surgery Instructions    General  Ear tubes have been placed in your ears to treat Eustachian tube dysfunction and its complications.  The Ear tubes remain in place on average of 2 years    Diet:  A short general anesthetic has been given, which may cause some nausea for the next 24 hours.  Advance diet as tolerated.  After the affects of anesthesia have worn off in 24 hours there are no dietary restrictions    Activity  Resume normal activity    Medications  Ear drops may be prescribed.  Use as directed on prescription. Save the remaining drops as you may need them from time to time if there is drainage from the ears.  Discomfort can usually be relieved with Tylenol.  Avoid aspirin containing medications    Work/School    You may return to work or school the day after the procedure    Follow-up    Please schedule a follow up for 3 weeks after surgery.  While the ear tubes are in place you will need to be seen every 6 months.    Additional Information    Bloody drainage from the ears during the first 3 days following surgery is to be expected.  Excessive blood or green drainage should be addressed by calling the office.    Swimming or bathing while the ear tubes are in, are permitted without ear plugs.  However, if swimming in rivers, lakes, going underwater in pools or very soapy baths ear protection is recommended.  Ear plugs can be purchased at your local pharmacy.  If you have any questions or concerns about your post-operative course please feel free to call the [facility] AMBULATORY PROCEDURE DISCHARGE INSTRUCTIONS    There are potential side effects of anesthesia or sedation you may experience for the first 24 hours.  These side effects include:    Confusion or Memory loss, Dizziness, or Delayed Reaction Times   [x]A responsible person should be with you for the

## 2024-12-16 NOTE — ANESTHESIA PRE PROCEDURE
Department of Anesthesiology  Preprocedure Note       Name:  eGrmaine Sage   Age:  49 y.o.  :  1975                                          MRN:  [id number]         Date:  2024      Surgeon: Surgeon(s):  Nicholas Mabry DO    Procedure: Procedure(s):  MYRINGOTOMY WITH PRESSURE EQUALIZING TUBE INSERTION, BILATERAL    Medications prior to admission:   Prior to Admission medications    Medication Sig Start Date End Date Taking? Authorizing Provider   Specialty Vitamins Products (HAIR NOURISHING SUPPLEMENT) TABS Take by mouth   Yes Iva Cole MD   Multiple Vitamin (MULTIVITAMIN PO) Take 1 tablet by mouth daily   Yes Iva Cole MD   predniSONE (DELTASONE) 5 MG tablet Take 1 tablet by mouth daily    Iva Cole MD   Fluticasone Propionate (XHANCE) 93 MCG/ACT EXHU 2 sprays by Nasal route in the morning and at bedtime 23   Nicholas Mabry DO   albuterol (PROVENTIL) (2.5 MG/3ML) 0.083% nebulizer solution Take 3 mLs by nebulization every 4 hours as needed 17   Iva Cole MD   NUCALA 100 MG SOLR injection Inject 3 mLs into the skin every 30 days 17   Iva Cole MD   PROAIR  (90 BASE) MCG/ACT inhaler INHALE TWO PUFFS BY MOUTH FOUR TIMES A DAY 17   Christiano Waggoner MD   Respiratory Therapy Supplies (NEBULIZER/TUBING/MOUTHPIECE) KIT 1 kit by Does not apply route daily as needed Dx: Asthma Severe       ICD-10: J45.909 16   Christiano Waggoner MD   Misc. Devices (ACAPELLA) MISC To use after neb treatment 4/15/16   Christiano Waggoner MD       Current medications:    Current Facility-Administered Medications   Medication Dose Route Frequency Provider Last Rate Last Admin   • lactated ringers infusion   IntraVENous Continuous Angelina Locke DO       • sodium chloride flush 0.9 % injection 5-40 mL  5-40 mL IntraVENous 2 times per day Nicholas Mabry DO       • sodium chloride flush 0.9 % injection 5-40 mL  5-40 mL IntraVENous PRN

## 2024-12-16 NOTE — BRIEF OP NOTE
Brief Postoperative Note      Patient: Germaine Sage  YOB: 1975  MRN: [id number]    Date of Procedure: 12/16/2024    Pre-Op Diagnosis Codes:      * Dysfunction of both eustachian tubes [H69.93]     * Fluid level behind tympanic membrane of both ears [H65.93]    Post-Op Diagnosis: Same       Procedure(s):  MYRINGOTOMY WITH PRESSURE EQUALIZING TUBE INSERTION, BILATERAL    Surgeon(s):  Nicholas Mabry DO    Assistant:  First Assistant: Cindy Henderson    Anesthesia: General    Estimated Blood Loss (mL): Minimal    Complications: None    Specimens:   * No specimens in log *    Implants:  Implant Name Type Inv. Item Serial No.  Lot No. LRB No. Used Action   TUBE VENT L12MM ID1.14MM JASMIN EDU T - [id number]  TUBE VENT L12MM ID1.14MM JASMIN EDU T  [employer] [id number] Right 1 Implanted   TUBE VENT L12MM ID1.14MM JASMIN EDU T - [id number]  TUBE VENT L12MM ID1.14MM JASMIN EDU T  [employer] [id number] Left 1 Implanted         Drains: * No LDAs found *    Findings:  Infection Present At Time Of Surgery (PATOS) (choose all levels that have infection present):  No infection present  Other Findings: right retained PE tube with dried mucoid debris. Left mucoid effusion.    Electronically signed by Nicholas Mabry DO on 12/16/2024 at 2:29 PM

## 2024-12-16 NOTE — OP NOTE
Operative Note      Patient: Germaine Sage  YOB: 1975  MRN: [id number]    Date of Procedure: 12/16/2024    Pre-Op Diagnosis Codes:      * Dysfunction of both eustachian tubes [H69.93]     * Fluid level behind tympanic membrane of both ears [H65.93]    Post-Op Diagnosis: Same       Procedure(s):  MYRINGOTOMY WITH PRESSURE EQUALIZING TUBE INSERTION, BILATERAL    Surgeon(s):  Nicholas Mabry DO    Assistant:   First Assistant: Cindy Henderson    Anesthesia: General    Estimated Blood Loss (mL): Minimal    Complications: None    Specimens:   * No specimens in log *    Implants:  Implant Name Type Inv. Item Serial No.  Lot No. LRB No. Used Action   TUBE VENT L12MM ID1.14MM JASMIN EDU T - [id number]  TUBE VENT L12MM ID1.14MM JASMIN EDU T  [employer] [id number] Right 1 Implanted   TUBE VENT L12MM ID1.14MM JASMIN EDU T - [id number]  TUBE VENT L12MM ID1.14MM JASMIN EDU T  [employer] [id number] Left 1 Implanted         Drains: * No LDAs found *    Findings:  Infection Present At Time Of Surgery (PATOS) (choose all levels that have infection present):  No infection present  Other Findings: right retained PE tube with dried mucoid debris. Left mucoid effusion.    Detailed Description of Procedure:   The patient was identified in the preoperative holding area.  Verified informed consent was obtained.  The patient was taken the operating suite, transferred to the operating table, sedated with general anesthesia and had an LMA placed.  The patient was prepped and draped in a standard fashion.  A proper timeout was performed.  Surgery began on the right side.  The operating microscope was brought to the field.  The right ear was visualized using a 4 mm speculum under the microscope.  A Lynn needle was used to dislodge the retained PE tube and the mucoid debris.  There was then removed with an alligator forcep.  The existing myringotomy was extended inferiorly using a

## 2024-12-16 NOTE — INTERVAL H&P NOTE
Update History & Physical    The patient's History and Physical of December 12, 2024 was reviewed with the patient and I examined the patient. There was no change. The surgical site was confirmed by the patient and me.     Plan: The risks, benefits, expected outcome, and alternative to the recommended procedure have been discussed with the patient. Patient understands and wants to proceed with the procedure.     Electronically signed by Nicholas Mabry DO on 12/16/2024 at 1:24 PM

## 2024-12-16 NOTE — PROGRESS NOTES
12/10/2024 1449 PM:     PRE-OP ORDERS/CONSENT TO BE ENTERED BY SURGEON'S OFFICE/TS.    12/10/2024 1456 PM:    LMOR REQUESTING PT TO RETURN CALL TO [facility] PAT AND NEEDS H&P/TS      
Ambulatory Surgery/Procedure Discharge Note    Vitals:    12/16/24 1450   BP: (!) 101/57   Pulse: 70   Resp: 16   Temp: 97.5 °F (36.4 °C)   SpO2: 98%     Pt meets discharge criteria per Madhuri score.  In: 120 [P.O.:120]  Out: -     Restroom use offered before discharge.  Yes    Pain assessment:  none  Pain Level: 0    Pt and S.O./family states \"ready to go home\". Pt alert and oriented x4. IV removed. Denies N/V or pain. Cotton in BL ears. No drainage noted.  Pt tolerating po intake. Discharge instructions given to pt and  with pt permission. Pt and  verbalized understanding of all instructions. Left with all belongings, and discharge instructions. 1 prescriptions e-scribed to pt pharmacy.     Patient discharged to home/self care. Patient discharged via wheel chair by transporter to waiting family/S.O.       12/16/2024 3:05 PM  
PACU DISCHARGE NOTE    Vital signs stable, pain well controlled, alert and oriented times three or at baseline, follow up per surgeon, no anesthetic complications.    Vitals:    12/16/24 1430   BP: 114/61   Pulse: 70   Resp: 18   Temp: 97.7 °F (36.5 °C)   SpO2: 97%         Intake/Output Summary (Last 24 hours) at 12/16/2024 1438  Last data filed at 12/16/2024 1430  Gross per 24 hour   Intake 120 ml   Output --   Net 120 ml       Pain assessment:  present - adequately treated       Patient transferred to care of KATHRYN RN.    12/16/2024 2:38 PM   
Pt arrived calm no ss of pain on RA report received from CRNA and RN circulator No Narcotics intra procedure   
eating/ drinking as you will have very specific instructions to follow.  If you did not receive these, call your surgeon's office immediately.     5. MEDICATIONS:  Take the following medications with a SMALL sip of water: PREDNISONE, INHALER  Use your usual dose of inhalers the morning of surgery. BRING your rescue inhaler with you to hospital.   Anesthesia does NOT want you to take insulin the morning of surgery. They will control your blood sugar while you are at the hospital. Please contact your ordering physician for instructions regarding your insulin the night before your procedure. If you have an insulin pump, please keep it set on basal rate.   Bariatric patient's call your surgeon if on diabetic medications as some may need to be stopped 1 week prior to surgery    6. Do not swallow additional water when brushing teeth. No gum, candy, mints, or ice chips. Refrain from smoking or at least decrease the amount on day of surgery.    7. Morning of surgery:   Take a shower with an antibacterial soap (i.e., Safeguard or Dial) OR your physician may have instructed you to use Hibiclens.  Dress in loose, comfortable clothing appropriate for redressing after your procedure.   Do not wear jewelry (including body piercings), make-up (especially NO eye make-up), fingernail polish (NO toenail polish if foot/leg surgery), lotion, powders, or metal hairclips.   Do not shave or wax for 72 hours prior to procedure near your operative site. Shaving with a razor can irritate your skin and make it easier to develop an infection. On the day of your procedure, any hair that needs to be removed near the surgical site will be 'clipped' by a healthcare worker using a special clipper designed to avoid skin irritation.    8. Dentures, glasses, or contacts will need to be removed before your procedure. Bring cases for your glasses, contacts, dentures, or hearing aids to protect them while you are in surgery.      9. If you use a CPAP, 
procedure.   Do not use any recreational marijuana at least 24 hours or street drugs (heroin, cocaine) at minimum 5 days prior to your procedure.   Please do not smoke the day of your procedure.    Dress in loose, comfortable clothing appropriate for redressing after your procedure.   Do not wear jewelry (including body piercings), make-up, fingernail polish, lotion, powders, or metal hairclips.   Contacts, glasses, dentures, and hearing aids will need to be removed prior to surgery.  Bring your case(s) to protect them while you are in surgery.    If you use a CPAP, please bring it with you on the day of your procedure.  If you use oxygen at home, please bring your oxygen tank with you to hospital.  Do not shave or wax for 72 hours prior to procedure near your operative site.  Leave all other valuables at home.  IF there is a change in your physical condition for some reason, such as: a cold, fever, rash, cuts, sores, or any other infection, especially near your surgical site, contact your surgeon's office immediately.  FOR WOMAN OF CHILDBEARING AGE ONLY- please make sure we can collect a urine sample upon arrival.     Instructions left on patient's voicemail.  El Osorio RN.12/12/2024 .8:04 AM

## 2024-12-16 NOTE — ANESTHESIA POSTPROCEDURE EVALUATION
Department of Anesthesiology  Postprocedure Note    Patient: Germaine Sage  MRN: [id number]  YOB: 1975  Date of evaluation: 12/16/2024    Procedure Summary       Date: 12/16/24 Room / Location: [address]    Anesthesia Start: 1334 Anesthesia Stop: 1413    Procedure: MYRINGOTOMY WITH PRESSURE EQUALIZING TUBE INSERTION, BILATERAL (Bilateral: Ear) Diagnosis:       Dysfunction of both eustachian tubes      Fluid level behind tympanic membrane of both ears      (Dysfunction of both eustachian tubes [H69.93])      (Fluid level behind tympanic membrane of both ears [H65.93])    Surgeons: Nicholas Mabry DO Responsible Provider: Dinesh Villatoro MD    Anesthesia Type: general ASA Status: 2            Anesthesia Type: No value filed.    Madhuri Phase I: Madhuri Score: 9    Madhuri Phase II: Madhuri Score: 10    Anesthesia Post Evaluation    Patient location during evaluation: PACU  Patient participation: complete - patient participated  Level of consciousness: awake  Pain score: 2  Airway patency: patent  Cardiovascular status: blood pressure returned to baseline  Respiratory status: acceptable  Hydration status: euvolemic  Pain management: adequate    No notable events documented.

## 2025-05-19 ENCOUNTER — OFFICE VISIT (OUTPATIENT)
Dept: ENT CLINIC | Age: 50
End: 2025-05-19
Payer: COMMERCIAL

## 2025-05-19 VITALS
DIASTOLIC BLOOD PRESSURE: 82 MMHG | BODY MASS INDEX: 26.36 KG/M2 | TEMPERATURE: 97.8 F | RESPIRATION RATE: 16 BRPM | WEIGHT: 164 LBS | SYSTOLIC BLOOD PRESSURE: 108 MMHG | HEIGHT: 66 IN | HEART RATE: 68 BPM

## 2025-05-19 DIAGNOSIS — H65.93 FLUID LEVEL BEHIND TYMPANIC MEMBRANE OF BOTH EARS: ICD-10-CM

## 2025-05-19 DIAGNOSIS — T85.618A NON-FUNCTIONING TYMPANOSTOMY TUBE, INITIAL ENCOUNTER: Primary | ICD-10-CM

## 2025-05-19 DIAGNOSIS — H69.93 DYSFUNCTION OF BOTH EUSTACHIAN TUBES: ICD-10-CM

## 2025-05-19 DIAGNOSIS — H92.01 RIGHT EAR PAIN: ICD-10-CM

## 2025-05-19 PROCEDURE — 99213 OFFICE O/P EST LOW 20 MIN: CPT | Performed by: OTOLARYNGOLOGY

## 2025-05-19 PROCEDURE — 92504 EAR MICROSCOPY EXAMINATION: CPT | Performed by: OTOLARYNGOLOGY

## 2025-05-19 ASSESSMENT — ENCOUNTER SYMPTOMS
SHORTNESS OF BREATH: 0
PHOTOPHOBIA: 0
SORE THROAT: 0
RHINORRHEA: 0
COLOR CHANGE: 0
NAUSEA: 0
EYE ITCHING: 0
EYE REDNESS: 0
STRIDOR: 0
SINUS PRESSURE: 0
COUGH: 0
EYE PAIN: 0
VOICE CHANGE: 0
CHOKING: 0
TROUBLE SWALLOWING: 0
SINUS PAIN: 0
FACIAL SWELLING: 0
DIARRHEA: 0

## 2025-05-19 NOTE — PROGRESS NOTES
[facility]  [address], [address]  [place] [address]  P: [phone]  F: [phone]       Patient     Germaine Sage  1975    ChiefComplaint     Chief Complaint   Patient presents with    Ear Problem     Tube do not feel right right ear feels like tube is coming out        History of Present Illness     Germaine Sage is a pleasant 50 y.o. female here for follow-up for placement of bilateral tubes in the OR in December 2024.  Right greater than left ear pain with tube feeling occluded.  Has tried peroxide and Floxin drops.    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[facility])     Dysfunction of both eustachian tubes     Fluid level behind tympanic membrane of both ears     Hearing loss     Lung disease     Meniere disease     Sinusitis     Tinnitus        Past Surgical History     Past Surgical History:   Procedure Laterality Date    BRONCHOSCOPY      DILATION AND CURETTAGE OF UTERUS      ENDOSCOPY, COLON, DIAGNOSTIC  04/21/2016    bronchscopy w/ BAL    MYRINGOTOMY Bilateral 12/16/2024    MYRINGOTOMY WITH PRESSURE EQUALIZING TUBE INSERTION, BILATERAL performed by Nicholas Mabry DO at [facility] OR    SINUS ENDOSCOPY Bilateral 12/28/2020    REVISION BILATERAL MAXILLARY ANTROSTOMY WITH TISSUE REMOVAL, BILATERAL ETHMOIDECTOMY WITH SPHENOIDOTOMY WITH TISSUE REMOVAL, FRONTAL SINUS EXPLORATION, BILATERAL SUBMUCOUS TURBINATE RESECTION performed by Nicholas Mabry DO at [facility] OR    SINUS SURGERY Bilateral 04/26/2017    BILATERAL FRONTAL SINUSOTOMIES WITH REMOVAL OF CONTENTS, BILATERAL ETHMOIDECTOMIES WITH REMOVAL OF CONTENTS, BILATERAL MAXILLARY ANTHROSTOMIES WITH REMOVAL OF CONTENTS       Family History     Family History   Problem Relation Age of Onset    Heart Disease Father        Social History     Social History     Socioeconomic History    Marital status:

## 2025-05-28 ENCOUNTER — OFFICE VISIT (OUTPATIENT)
Dept: ENT CLINIC | Age: 50
End: 2025-05-28
Payer: COMMERCIAL

## 2025-05-28 VITALS
TEMPERATURE: 97.8 F | HEIGHT: 66 IN | DIASTOLIC BLOOD PRESSURE: 71 MMHG | SYSTOLIC BLOOD PRESSURE: 114 MMHG | HEART RATE: 59 BPM | BODY MASS INDEX: 26.68 KG/M2 | RESPIRATION RATE: 16 BRPM | WEIGHT: 166 LBS

## 2025-05-28 DIAGNOSIS — H69.93 DYSFUNCTION OF BOTH EUSTACHIAN TUBES: ICD-10-CM

## 2025-05-28 DIAGNOSIS — H61.21 IMPACTED CERUMEN OF RIGHT EAR: ICD-10-CM

## 2025-05-28 DIAGNOSIS — T85.618D NON-FUNCTIONING TYMPANOSTOMY TUBE, SUBSEQUENT ENCOUNTER: Primary | ICD-10-CM

## 2025-05-28 DIAGNOSIS — H65.93 FLUID LEVEL BEHIND TYMPANIC MEMBRANE OF BOTH EARS: ICD-10-CM

## 2025-05-28 DIAGNOSIS — M30.1 CSS (CHURG-STRAUSS SYNDROME) (HCC): ICD-10-CM

## 2025-05-28 DIAGNOSIS — D72.18 CSS (CHURG-STRAUSS SYNDROME) (HCC): ICD-10-CM

## 2025-05-28 PROCEDURE — 99214 OFFICE O/P EST MOD 30 MIN: CPT | Performed by: OTOLARYNGOLOGY

## 2025-05-28 PROCEDURE — 69210 REMOVE IMPACTED EAR WAX UNI: CPT | Performed by: OTOLARYNGOLOGY

## 2025-05-28 ASSESSMENT — ENCOUNTER SYMPTOMS
RHINORRHEA: 0
STRIDOR: 0
TROUBLE SWALLOWING: 0
SORE THROAT: 0
DIARRHEA: 0
EYE PAIN: 0
COLOR CHANGE: 0
CHOKING: 0
SHORTNESS OF BREATH: 0
EYE ITCHING: 0
VOICE CHANGE: 0
FACIAL SWELLING: 0
PHOTOPHOBIA: 0
SINUS PAIN: 0
EYE REDNESS: 0
NAUSEA: 0
SINUS PRESSURE: 0
COUGH: 0

## 2025-05-28 NOTE — PROGRESS NOTES
[facility]  [address], [address]  [place] [address]  P: [phone]  F: [phone]       Patient     Germaine Sage  1975    ChiefComplaint     Chief Complaint   Patient presents with    Follow-up     Things are the same no better       History of Present Illness     Germaine Sage is a pleasant 50 y.o. female here for follow-up for occluded bilateral PE tubes in the setting of Churg-Angela syndrome, nasal polyposis and chronic serous otitis media.  I had the patient use peroxide twice a day for the last week.  She does not feel any difference.    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[facility])     Dysfunction of both eustachian tubes     Fluid level behind tympanic membrane of both ears     Hearing loss     Lung disease     Meniere disease     Sinusitis     Tinnitus        Past Surgical History     Past Surgical History:   Procedure Laterality Date    BRONCHOSCOPY      DILATION AND CURETTAGE OF UTERUS      ENDOSCOPY, COLON, DIAGNOSTIC  04/21/2016    bronchscopy w/ BAL    MYRINGOTOMY Bilateral 12/16/2024    MYRINGOTOMY WITH PRESSURE EQUALIZING TUBE INSERTION, BILATERAL performed by Nicholas Mabry DO at [facility] OR    SINUS ENDOSCOPY Bilateral 12/28/2020    REVISION BILATERAL MAXILLARY ANTROSTOMY WITH TISSUE REMOVAL, BILATERAL ETHMOIDECTOMY WITH SPHENOIDOTOMY WITH TISSUE REMOVAL, FRONTAL SINUS EXPLORATION, BILATERAL SUBMUCOUS TURBINATE RESECTION performed by Nicholas Mabry DO at [facility] OR    SINUS SURGERY Bilateral 04/26/2017    BILATERAL FRONTAL SINUSOTOMIES WITH REMOVAL OF CONTENTS, BILATERAL ETHMOIDECTOMIES WITH REMOVAL OF CONTENTS, BILATERAL MAXILLARY ANTHROSTOMIES WITH REMOVAL OF CONTENTS       Family History     Family History   Problem Relation Age of Onset    Heart Disease Father        Social History     Social History     Socioeconomic History

## 2025-07-07 ENCOUNTER — OFFICE VISIT (OUTPATIENT)
Dept: ENT CLINIC | Age: 50
End: 2025-07-07
Payer: COMMERCIAL

## 2025-07-07 VITALS
DIASTOLIC BLOOD PRESSURE: 62 MMHG | HEART RATE: 74 BPM | BODY MASS INDEX: 26.97 KG/M2 | SYSTOLIC BLOOD PRESSURE: 101 MMHG | WEIGHT: 167 LBS

## 2025-07-07 DIAGNOSIS — H65.93 FLUID LEVEL BEHIND TYMPANIC MEMBRANE OF BOTH EARS: Primary | ICD-10-CM

## 2025-07-07 DIAGNOSIS — J33.9 NASAL POLYPOSIS: ICD-10-CM

## 2025-07-07 DIAGNOSIS — J32.4 CHRONIC PANSINUSITIS: ICD-10-CM

## 2025-07-07 DIAGNOSIS — M30.1 CSS (CHURG-STRAUSS SYNDROME) (HCC): ICD-10-CM

## 2025-07-07 DIAGNOSIS — D72.18 CSS (CHURG-STRAUSS SYNDROME) (HCC): ICD-10-CM

## 2025-07-07 DIAGNOSIS — Z96.22 PATENT TYMPANOSTOMY TUBE: ICD-10-CM

## 2025-07-07 DIAGNOSIS — H69.93 DYSFUNCTION OF BOTH EUSTACHIAN TUBES: ICD-10-CM

## 2025-07-07 PROCEDURE — 99214 OFFICE O/P EST MOD 30 MIN: CPT | Performed by: OTOLARYNGOLOGY

## 2025-07-07 PROCEDURE — 92504 EAR MICROSCOPY EXAMINATION: CPT | Performed by: OTOLARYNGOLOGY

## 2025-07-07 RX ORDER — PREDNISONE 10 MG/1
40 TABLET ORAL DAILY
Qty: 20 TABLET | Refills: 0 | Status: SHIPPED | OUTPATIENT
Start: 2025-07-07 | End: 2025-07-12

## 2025-07-07 ASSESSMENT — ENCOUNTER SYMPTOMS
NAUSEA: 0
EYE ITCHING: 0
CHOKING: 0
STRIDOR: 0
SINUS PAIN: 0
SORE THROAT: 0
COLOR CHANGE: 0
FACIAL SWELLING: 0
EYE REDNESS: 0
SHORTNESS OF BREATH: 0
DIARRHEA: 0
RHINORRHEA: 1
EYE PAIN: 0
PHOTOPHOBIA: 0
VOICE CHANGE: 0
COUGH: 0
SINUS PRESSURE: 1
TROUBLE SWALLOWING: 0

## 2025-07-07 NOTE — PROGRESS NOTES
normal. No tracheal deviation.   Pulmonary:      Effort: Pulmonary effort is normal. No respiratory distress.      Breath sounds: No stridor.   Musculoskeletal:      Cervical back: Neck supple.   Lymphadenopathy:      Cervical: No cervical adenopathy.   Skin:     General: Skin is warm and dry.   Neurological:      Mental Status: She is alert and oriented to person, place, and time.      Cranial Nerves: No cranial nerve deficit.   Psychiatric:         Behavior: Behavior normal.           Procedure     Otomicroscopy    An operating microscope was utilized to visualize the external auditory canals using a 4mm speculum. The external auditory canals are clear.  Bilateral T tubes in good position and patent.  Small amount of dried mucoid debris bilaterally.        Assessment and Plan     1. Fluid level behind tympanic membrane of both ears  Ear tubes doing well with steroid drops.  Continue.    2. Dysfunction of both eustachian tubes      3. CSS (Churg-Nagela syndrome) (HCC)      4. Patent tympanostomy tube      5. Chronic pansinusitis  Patient has exacerbation of polyp burden with an active infection.  Recommended prednisone burst for 5 days and 2 weeks of Augmentin.  Proper administration risk medication discussed.  Follow-up in 1 month.  - predniSONE (DELTASONE) 10 MG tablet; Take 4 tablets by mouth daily for 5 days  Dispense: 20 tablet; Refill: 0  - amoxicillin-clavulanate (AUGMENTIN) 875-125 MG per tablet; Take 1 tablet by mouth 2 times daily for 14 days  Dispense: 28 tablet; Refill: 0    6. Nasal polyposis    - predniSONE (DELTASONE) 10 MG tablet; Take 4 tablets by mouth daily for 5 days  Dispense: 20 tablet; Refill: 0      Return in about 4 weeks (around 8/4/2025).      [ ] Review/order radiology tests   [ ] Independent interpretation of diagnostic test by another provider  [ ] Discussed case with another provider  [ ] High risk of loss of major body function  [ ] Elective major surgery with risk

## 2025-08-04 ENCOUNTER — OFFICE VISIT (OUTPATIENT)
Dept: ENT CLINIC | Age: 50
End: 2025-08-04
Payer: COMMERCIAL

## 2025-08-04 VITALS
DIASTOLIC BLOOD PRESSURE: 74 MMHG | OXYGEN SATURATION: 93 % | SYSTOLIC BLOOD PRESSURE: 117 MMHG | HEART RATE: 69 BPM | TEMPERATURE: 97.6 F | RESPIRATION RATE: 18 BRPM

## 2025-08-04 DIAGNOSIS — Z96.22 PATENT TYMPANOSTOMY TUBE: ICD-10-CM

## 2025-08-04 DIAGNOSIS — J32.4 CHRONIC PANSINUSITIS: Primary | ICD-10-CM

## 2025-08-04 DIAGNOSIS — J32.9 SINUSITIS WITH NASAL POLYPS: ICD-10-CM

## 2025-08-04 DIAGNOSIS — J33.9 SINUSITIS WITH NASAL POLYPS: ICD-10-CM

## 2025-08-04 DIAGNOSIS — J33.9 NASAL POLYPOSIS: ICD-10-CM

## 2025-08-04 PROCEDURE — 92504 EAR MICROSCOPY EXAMINATION: CPT | Performed by: OTOLARYNGOLOGY

## 2025-08-04 PROCEDURE — 99214 OFFICE O/P EST MOD 30 MIN: CPT | Performed by: OTOLARYNGOLOGY

## 2025-08-04 RX ORDER — FLUTICASONE PROPIONATE 93 UG/1
2 SPRAY, METERED NASAL 2 TIMES DAILY
Qty: 32 ML | Refills: 11 | Status: SHIPPED | OUTPATIENT
Start: 2025-08-04

## 2025-08-04 ASSESSMENT — ENCOUNTER SYMPTOMS
DIARRHEA: 0
NAUSEA: 0
VOICE CHANGE: 0
EYE ITCHING: 0
COLOR CHANGE: 0
FACIAL SWELLING: 0
STRIDOR: 0
EYE REDNESS: 0
SINUS PRESSURE: 0
SORE THROAT: 0
CHOKING: 0
SHORTNESS OF BREATH: 0
PHOTOPHOBIA: 0
EYE PAIN: 0
RHINORRHEA: 0
COUGH: 0
TROUBLE SWALLOWING: 0
SINUS PAIN: 0

## (undated) DEVICE — TUBING, SUCTION, 1/4" X 12', STRAIGHT: Brand: MEDLINE

## (undated) DEVICE — GLOVE ORANGE PI 7 1/2   MSG9075

## (undated) DEVICE — JEWISH HOSPITAL TURNOVER KIT: Brand: MEDLINE INDUSTRIES, INC.

## (undated) DEVICE — PLATE ES AD W 9FT CRD 2

## (undated) DEVICE — INSTRUMENT TRACKER 9733533XOM ENT 1PK

## (undated) DEVICE — BLADE 1884080EM TRICUT 4MMX13CM M4 ROHS: Brand: FUSION®

## (undated) DEVICE — TUBE SUCT LAPSCP

## (undated) DEVICE — NASAL FESS: Brand: MEDLINE INDUSTRIES, INC.

## (undated) DEVICE — PATIENT TRACKER 9734887XOM NON-INVASIVE

## (undated) DEVICE — BLADE 1882040HR 5PK M4 INF TURB 2MM ROT: Brand: STRAIGHTSHOT

## (undated) DEVICE — SPONGE,NEURO,1"X3",XR,STRL,LF,10/PK: Brand: MEDLINE

## (undated) DEVICE — SPLINT NSL W0.6XL2IN INTNSL CHITOSAN POLYMER HYDRATED

## (undated) DEVICE — SOLUTION IV 1000ML 0.9% SOD CHL

## (undated) DEVICE — DRAPE,INSTRUMENT,MAGNETIC,10X16: Brand: MEDLINE

## (undated) DEVICE — STANDARD HYPODERMIC NEEDLE,POLYPROPYLENE HUB: Brand: MONOJECT

## (undated) DEVICE — PROPEL MINI SINUS IMPLANT: Brand: PROPEL MINI

## (undated) DEVICE — SYRINGE MED 50ML LUERLOCK TIP

## (undated) DEVICE — BLADE MYR OFFSET 45DEG SPEAR TIP NAR SHFT W/ RND KNURLED

## (undated) DEVICE — COTTON BALLS: Brand: DEROYAL

## (undated) DEVICE — GARMENT,MEDLINE,DVT,INT,CALF,MED, GEN2: Brand: MEDLINE

## (undated) DEVICE — PACK,EENT,TURBAN DRAPE,PK II: Brand: MEDLINE

## (undated) DEVICE — SOLUTION IV 250ML 0.9% SOD CHL PH 5 INJ USP VIAFLX PLAS

## (undated) DEVICE — SURE SET-DOUBLE BASIN-LF: Brand: MEDLINE INDUSTRIES, INC.

## (undated) DEVICE — SURGICAL SET UP - SURE SET: Brand: MEDLINE INDUSTRIES, INC.